# Patient Record
Sex: MALE | Race: WHITE | NOT HISPANIC OR LATINO | Employment: OTHER | URBAN - METROPOLITAN AREA
[De-identification: names, ages, dates, MRNs, and addresses within clinical notes are randomized per-mention and may not be internally consistent; named-entity substitution may affect disease eponyms.]

---

## 2017-01-02 ENCOUNTER — OFFICE VISIT (OUTPATIENT)
Dept: OPHTHALMOLOGY | Facility: CLINIC | Age: 72
End: 2017-01-02
Payer: COMMERCIAL

## 2017-01-02 DIAGNOSIS — H52.201 ASTIGMATISM, RIGHT: ICD-10-CM

## 2017-01-02 DIAGNOSIS — H52.4 PRESBYOPIA: Primary | ICD-10-CM

## 2017-01-02 DIAGNOSIS — H52.03 HYPERMETROPIA, BILATERAL: ICD-10-CM

## 2017-01-02 PROCEDURE — 92015 DETERMINE REFRACTIVE STATE: CPT | Performed by: OPHTHALMOLOGY

## 2017-01-02 PROCEDURE — 92004 COMPRE OPH EXAM NEW PT 1/>: CPT | Performed by: OPHTHALMOLOGY

## 2017-01-02 ASSESSMENT — SLIT LAMP EXAM - LIDS
COMMENTS: 2+, DERMATOCHALASIS
COMMENTS: 2+, DERMATOCHALASIS

## 2017-01-02 ASSESSMENT — VISUAL ACUITY
METHOD: SNELLEN - LINEAR
OD_CC: 20/20
OS_CC: J2
OD_CC: J2
OS_CC: 20/25
CORRECTION_TYPE: GLASSES

## 2017-01-02 ASSESSMENT — REFRACTION_WEARINGRX
OS_ADD: +2.25
OD_ADD: +2.25
OS_CYLINDER: +0.50
OS_SPHERE: +3.00
OD_AXIS: 055
OD_CYLINDER: +1.00
OS_AXIS: 055
SPECS_TYPE: PAL
OD_SPHERE: +3.00

## 2017-01-02 ASSESSMENT — CONF VISUAL FIELD
OD_NORMAL: 1
OS_NORMAL: 1

## 2017-01-02 ASSESSMENT — EXTERNAL EXAM - LEFT EYE: OS_EXAM: 2+ BROW PTOSIS, PROLAPSED FAT PADS: LOWER

## 2017-01-02 ASSESSMENT — REFRACTION_MANIFEST
OD_AXIS: 032
OS_CYLINDER: SPHERE
OD_CYLINDER: +0.50
OD_SPHERE: +3.00
OS_ADD: +3.00
OS_SPHERE: +3.00
OD_ADD: +3.00

## 2017-01-02 ASSESSMENT — TONOMETRY
IOP_METHOD: APPLANATION
OD_IOP_MMHG: 15
OS_IOP_MMHG: 18

## 2017-01-02 ASSESSMENT — EXTERNAL EXAM - RIGHT EYE: OD_EXAM: 2+ BROW PTOSIS, PROLAPSED FAT PADS: LOWER

## 2017-01-02 ASSESSMENT — CUP TO DISC RATIO
OD_RATIO: 0.0
OS_RATIO: 0.1

## 2017-01-02 NOTE — PATIENT INSTRUCTIONS
Glasses Rx given -optional   Possible posterior vitreous detachment (sudden onset large floater and/or flashing lights) discussed.  Call in September 2018 for an appointment in January 2019 for Complete Exam    Dr. Drummond [phone]

## 2017-01-02 NOTE — PROGRESS NOTES
" Current Eye Medications:  None     Subjective:  COMPLETE EYE EXAM Patient states that at times he notices that both eyes are foggy.  Vision is ok.  \"Just time to update\".     Objective:  See Ophthalmology Exam.       Assessment: Stable eye exam.      ICD-10-CM    1. Presbyopia H52.4 EYE EXAM (SIMPLE-NONBILLABLE)     REFRACTIVE STATUS   2. Astigmatism, right H52.201    3. Hypermetropia, bilateral H52.03         Plan: Glasses Rx given -optional   Possible posterior vitreous detachment (sudden onset large floater and/or flashing lights) discussed.  Call in September 2018 for an appointment in January 2019 for Complete Exam    Dr. Drummond [phone]           "

## 2017-01-02 NOTE — MR AVS SNAPSHOT
After Visit Summary   1/2/2017    Dejan Hammer    MRN: [id number]           Patient Information     Date Of Birth          1945        Visit Information        Provider Department      1/2/2017 8:30 AM Anson Drummond MD [facility]        Today's Diagnoses     Presbyopia    -  1     Astigmatism, right         Hypermetropia, bilateral           Care Instructions    Glasses Rx given -optional   Possible posterior vitreous detachment (sudden onset large floater and/or flashing lights) discussed.  Call in September 2018 for an appointment in January 2019 for Complete Exam    Dr. Drummond [phone]        Follow-ups after your visit        Who to contact     If you have questions or need follow up information about today's clinic visit or your schedule please contact [facility] directly at [phone].  Normal or non-critical lab and imaging results will be communicated to you by GT Solarhart, letter or phone within 4 business days after the clinic has received the results. If you do not hear from us within 7 days, please contact the clinic through GT Solarhart or phone. If you have a critical or abnormal lab result, we will notify you by phone as soon as possible.  Submit refill requests through [facility] or call your pharmacy and they will forward the refill request to us. Please allow 3 business days for your refill to be completed.          Additional Information About Your Visit        MyChart Information     [facility] gives you secure access to your electronic health record. If you see a primary care provider, you can also send messages to your care team and make appointments. If you have questions, please call your primary care clinic.  If you do not have a primary care provider, please call [phone] and they will assist you.         Blood Pressure from Last 3 Encounters:   12/02/16 126/76   11/07/16 122/76   05/24/16 117/71    Weight from Last 3 Encounters:   12/02/16  92.137 kg (203 lb 2 oz)   11/07/16 90.436 kg (199 lb 6 oz)   05/24/16 91.173 kg (201 lb)              We Performed the Following     EYE EXAM (SIMPLE-NONBILLABLE)     REFRACTIVE STATUS        Primary Care Provider Office Phone # Fax #    César Gregory PA-C [phone]       [facility] [address] [facility] [address]        Thank you!     Thank you for choosing [facility]  for your care. Our goal is always to provide you with excellent care. Hearing back from our patients is one way we can continue to improve our services. Please take a few minutes to complete the written survey that you may receive in the mail after your visit with us. Thank you!             Your Updated Medication List - Protect others around you: Learn how to safely use, store and throw away your medicines at www.disposemymeds.org.          This list is accurate as of: 1/2/17  9:16 AM.  Always use your most recent med list.                   Brand Name Dispense Instructions for use    ADVIL 200 MG tablet   Generic drug:  ibuprofen      Take 200 mg by mouth every 4 hours as needed.       aspirin 81 MG tablet      Take by mouth daily       CALCIUM 1000 + D PO      Take 2 tablets by mouth daily.       Fiber Tabs      Take 4 tablets by mouth daily       fish oil-omega-3 fatty acids 1000 MG capsule      Take 2 capsules by mouth daily       garlic 150 MG Tabs tablet      Take 150 mg by mouth daily       MULTIVITAMIN PO          oxyCODONE-acetaminophen 5-325 MG per tablet    PERCOCET    40 tablet    Take 1-2 tablets by mouth every 6 hours as needed for moderate to severe pain       SAW PALMETTO-PUMPKIN SEED OIL PO      Take 1 tablet by mouth daily. Plus Zinc       UNABLE TO FIND      MEDICATION NAME: Relief Factor (natural supplement for pain)       vitamin D3 2000 UNITS Caps      Take 4,000 Units by mouth daily       vitamin E 400 UNIT capsule      Take 400 Units by mouth daily

## 2017-03-27 ENCOUNTER — TRANSFERRED RECORDS (OUTPATIENT)
Dept: HEALTH INFORMATION MANAGEMENT | Facility: CLINIC | Age: 72
End: 2017-03-27

## 2017-06-23 ENCOUNTER — OFFICE VISIT (OUTPATIENT)
Dept: FAMILY MEDICINE | Facility: CLINIC | Age: 72
End: 2017-06-23
Payer: COMMERCIAL

## 2017-06-23 VITALS
WEIGHT: 199 LBS | TEMPERATURE: 98.3 F | DIASTOLIC BLOOD PRESSURE: 82 MMHG | BODY MASS INDEX: 28.49 KG/M2 | HEIGHT: 70 IN | SYSTOLIC BLOOD PRESSURE: 130 MMHG | HEART RATE: 72 BPM

## 2017-06-23 DIAGNOSIS — M70.22 OLECRANON BURSITIS OF LEFT ELBOW: Primary | ICD-10-CM

## 2017-06-23 PROCEDURE — 99213 OFFICE O/P EST LOW 20 MIN: CPT | Performed by: PHYSICIAN ASSISTANT

## 2017-06-23 NOTE — MR AVS SNAPSHOT
After Visit Summary   6/23/2017    Dejan Hammer    MRN: [id number]           Patient Information     Date Of Birth          1945        Visit Information        Provider Department      6/23/2017 2:20 PM César Gregory PA-C [facility]        Today's Diagnoses     Olecranon bursitis of left elbow    -  1      Care Instructions                   Elbow (Olecranon) Bursitis              What is elbow (olecranon) bursitis?   A bursa is a fluid-filled sac that acts as a cushion between tendons, bones, and skin. Irritation or inflammation of a bursa is called bursitis. Olecranon bursitis causes pain or swelling at the point of the elbow.   How does it occur?   Repeated injury, such as falling onto the elbow or rubbing the elbow against a hard surface, irritates the bursa.   What are the symptoms?   The bursa at the point of the elbow is swollen. This swelling may be painful. It may hurt to bend and straighten your elbow. There may be warmth and redness. Sometimes the fluid inside the bursa can get infected.   How is it diagnosed?   Your healthcare provider will review your symptoms and examine your elbow.   How is it treated?   To treat this condition:   Put an ice pack, gel pack, or package of frozen vegetables, wrapped in a cloth on the area every 3 to 4 hours, for up to 20 minutes at a time.   Wrap an elastic bandage around your elbow to keep the bursa from swelling more.   Protect your elbow with a pad.   Take an anti-inflammatory medicine such as ibuprofen, or other medicine as directed by your provider. Nonsteroidal anti-inflammatory medicines (NSAIDs) may cause stomach bleeding and other problems. These risks increase with age. Read the label and take as directed. Unless recommended by your healthcare provider, do not take for more than 10 days.   Your provider may remove some of the bursa fluid with a needle and syringe. In some cases, ongoing (chronic) bursitis may  require surgical removal of the bursa.   How long will the effects last?   The length of recovery depends on many factors such as your age, health, and if you have had a previous injury. Recovery time also depends on the severity of the injury. The pain is usually gone within a few weeks, but there may be swelling for up to several months. Ask your healthcare provider when you can return to your normal activities.   How can I prevent it?   Olecranon bursitis can be best prevented by avoiding direct contact to the point of your elbow. It is important not to irritate the bursa by leaning your elbow onto a surface such as a table or a desk.           Published by [employer].  This content is reviewed periodically and is subject to change as new health information becomes available. The information is intended to inform and educate and is not a replacement for medical evaluation, advice, diagnosis or treatment by a healthcare professional.   Written by Juan Pablo Carvalho MD, for [employer].   ? 2010 [employer] and/or its affiliates. All Rights Reserved.   Copyright   Clinical Reference Systems 2011      [facility]   Discharged by : Mariposa TAVERA Certified Medical Assistant (AAMA)June 23, 2017 3:06 PM    Paper scripts provided to patient : none   If you have any questions regarding to your visit please contact your care team:   [facility] Clinic Hours Telephone Number   SONU [address] PA-C    7am-7pm Monday - Thursday   7am-5pm Fridays  [phone]   (Appointment scheduling available 24/7)   RN Line   [phone] option 2       What options do I have for visits at the clinic other than the traditional office visit?   To expand how we care for you, many of our providers are utilizing electronic visits (e-visits) and telephone visits, when medically appropriate, for interactions with their patients rather than a visit in the clinic. We also offer  nurse visits for many medical concerns. Just like any other service, we will bill your insurance company for this type of visit based on time spent on the phone with your provider. Not all insurance companies cover these visits. Please check with your medical insurance if this type of visit is covered. You will be responsible for any charges that are not paid by your insurance.     E-visits via Emergent Healthhart: generally incur a $35.00 fee.     Telephone visits:   Time spent on the phone: *charged based on time that is spent on the phone in increments of 10 minutes. Estimated cost:   5-10 mins $30.00   11-20 mins. $59.00   21-30 mins. $85.00   Use navigaya (secure email communication and access to your chart) to send your primary care provider a message or make an appointment. Ask someone on your Team how to sign up for navigaya.   For a Price Quote for your services, please call our Impulsiv Line at [phone].   As always, Thank you for trusting us with your health care needs!                [place] [facility]:    Scheduling Appointments  [facility]  Call: [phone]    [facility]  Call: [phone]    [facility]  Call: [phone]                    Follow-ups after your visit        Who to contact     If you have questions or need follow up information about today's clinic visit or your schedule please contact [facility] directly at [phone].  Normal or non-critical lab and imaging results will be communicated to you by MyChart, letter or phone within 4 business days after the clinic has received the results. If you do not hear from us within 7 days, please contact the clinic through Emergent Healthhart or phone. If you have a critical or abnormal lab result, we will notify you by phone as soon as possible.  Submit refill requests through navigaya or call your pharmacy and they will forward the refill request to  "us. Please allow 3 business days for your refill to be completed.          Additional Information About Your Visit        MyChart Information     TeamSupporthart gives you secure access to your electronic health record. If you see a primary care provider, you can also send messages to your care team and make appointments. If you have questions, please call your primary care clinic.  If you do not have a primary care provider, please call [phone] and they will assist you.        Care EveryWhere ID     This is your Care EveryWhere ID. This could be used by other organizations to access your [place] medical records  [id number]        Your Vitals Were     Pulse Temperature Height BMI (Body Mass Index)          72 98.3  F (36.8  C) (Oral) 5' 10\" (1.778 m) 28.55 kg/m2         Blood Pressure from Last 3 Encounters:   06/23/17 130/82   12/02/16 126/76   11/07/16 122/76    Weight from Last 3 Encounters:   06/23/17 199 lb (90.3 kg)   12/02/16 203 lb 2 oz (92.1 kg)   11/07/16 199 lb 6 oz (90.4 kg)              Today, you had the following     No orders found for display       Primary Care Provider Office Phone # Fax #    César Gregory PA-C [phone]       [address] [address]        Equal Access to Services     BETO VOGT : Hadii aad ku hadasho Soomaali, waaxda luqadaha, qaybta kaalmada adeegyada, laquita almanza. So [facility] [phone].    ATENCIÓN: Si habla español, tiene a mercado disposición servicios gratuitos de asistencia lingüística. Ever akhtar [phone].    We comply with applicable federal civil rights laws and [place] laws. We do not discriminate on the basis of race, color, national origin, age, disability sex, sexual orientation or gender identity.            Thank you!     Thank you for choosing [facility]  for your care. Our goal is always to provide you with excellent care. Hearing back from our " patients is one way we can continue to improve our services. Please take a few minutes to complete the written survey that you may receive in the mail after your visit with us. Thank you!             Your Updated Medication List - Protect others around you: Learn how to safely use, store and throw away your medicines at www.disposemymeds.org.          This list is accurate as of: 6/23/17  3:06 PM.  Always use your most recent med list.                   Brand Name Dispense Instructions for use Diagnosis    ADVIL 200 MG tablet   Generic drug:  ibuprofen      Take 200 mg by mouth every 4 hours as needed.        aspirin 81 MG tablet      Take by mouth daily        CALCIUM 1000 + D PO      Take 2 tablets by mouth daily.        Fiber Tabs      Take 4 tablets by mouth daily        fish oil-omega-3 fatty acids 1000 MG capsule      Take 2 capsules by mouth daily        garlic 150 MG Tabs tablet      Take 150 mg by mouth daily        MULTIVITAMIN PO           SAW PALMETTO-PUMPKIN SEED OIL PO      Take 1 tablet by mouth daily. Plus Zinc        UNABLE TO FIND      MEDICATION NAME: Relief Factor (natural supplement for pain)        vitamin D3 2000 UNITS Caps      Take 4,000 Units by mouth daily        vitamin E 400 UNIT capsule      Take 400 Units by mouth daily

## 2017-06-23 NOTE — PROGRESS NOTES
"  SUBJECTIVE:                                                    Dejan Hammer is a 72 year old male who presents to clinic today for the following health issues:    Joint Pain    Onset: 2 weeks ago    Description:   Location: left elbow  Character: no pain just swelling    Intensity: mild    Progression of Symptoms: same    Accompanying Signs & Symptoms:  Other symptoms: swelling    History:   Previous similar pain: no       Precipitating factors:   Trauma or overuse: no     Alleviating factors:  Improved by: nothing    Therapies Tried and outcome: none    Problem list and histories reviewed & adjusted, as indicated.  Additional history: as documented    Labs reviewed in EPIC    Reviewed and updated as needed this visit by clinical staff       Reviewed and updated as needed this visit by Provider       ROS:  Constitutional, HEENT, cardiovascular, pulmonary, gi and gu systems are negative, except as otherwise noted.    OBJECTIVE:     /82 (BP Location: Right arm, Cuff Size: Adult Regular)  Pulse 72  Temp 98.3  F (36.8  C) (Oral)  Ht 5' 10\" (1.778 m)  Wt 199 lb (90.3 kg)  BMI 28.55 kg/m2  Body mass index is 28.55 kg/(m^2).  GENERAL: healthy, alert and no distress  MUSC: Left elbow olecranon bursa enlargement, fluctuant, non tender, no erythema. ROM of the left elbow and arm is normal     ASSESSMENT/PLAN:       ICD-10-CM    1. Olecranon bursitis of left elbow M70.22       Reassurance. Reviewed avoiding exacerbating activities. PRICE, wrap applied. Follow up with sports or ortho if persisting.     AGUSTIN RUBI PA-C  [facility]    "

## 2017-06-23 NOTE — PATIENT INSTRUCTIONS
Elbow (Olecranon) Bursitis              What is elbow (olecranon) bursitis?   A bursa is a fluid-filled sac that acts as a cushion between tendons, bones, and skin. Irritation or inflammation of a bursa is called bursitis. Olecranon bursitis causes pain or swelling at the point of the elbow.   How does it occur?   Repeated injury, such as falling onto the elbow or rubbing the elbow against a hard surface, irritates the bursa.   What are the symptoms?   The bursa at the point of the elbow is swollen. This swelling may be painful. It may hurt to bend and straighten your elbow. There may be warmth and redness. Sometimes the fluid inside the bursa can get infected.   How is it diagnosed?   Your healthcare provider will review your symptoms and examine your elbow.   How is it treated?   To treat this condition:   Put an ice pack, gel pack, or package of frozen vegetables, wrapped in a cloth on the area every 3 to 4 hours, for up to 20 minutes at a time.   Wrap an elastic bandage around your elbow to keep the bursa from swelling more.   Protect your elbow with a pad.   Take an anti-inflammatory medicine such as ibuprofen, or other medicine as directed by your provider. Nonsteroidal anti-inflammatory medicines (NSAIDs) may cause stomach bleeding and other problems. These risks increase with age. Read the label and take as directed. Unless recommended by your healthcare provider, do not take for more than 10 days.   Your provider may remove some of the bursa fluid with a needle and syringe. In some cases, ongoing (chronic) bursitis may require surgical removal of the bursa.   How long will the effects last?   The length of recovery depends on many factors such as your age, health, and if you have had a previous injury. Recovery time also depends on the severity of the injury. The pain is usually gone within a few weeks, but there may be swelling for up to several months. Ask your healthcare provider when you  can return to your normal activities.   How can I prevent it?   Olecranon bursitis can be best prevented by avoiding direct contact to the point of your elbow. It is important not to irritate the bursa by leaning your elbow onto a surface such as a table or a desk.           Published by [employer].  This content is reviewed periodically and is subject to change as new health information becomes available. The information is intended to inform and educate and is not a replacement for medical evaluation, advice, diagnosis or treatment by a healthcare professional.   Written by Juan Pablo Carvalho MD, for [employer].   ? 2010 [facility] and/or its affiliates. All Rights Reserved.   Copyright   Clinical Reference Systems 2011      [facility]   Discharged by : Mariposa TAVERA Certified Medical Assistant (AAMA)June 23, 2017 3:06 PM    Paper scripts provided to patient : none   If you have any questions regarding to your visit please contact your care team:   [facility] Clinic Hours Telephone Number   SONU Galvez Dr., PA-C    7am-7pm Monday - Thursday   7am-5pm Fridays  [phone]   (Appointment scheduling available 24/7)   RN Line   [phone] option 2       What options do I have for visits at the clinic other than the traditional office visit?   To expand how we care for you, many of our providers are utilizing electronic visits (e-visits) and telephone visits, when medically appropriate, for interactions with their patients rather than a visit in the clinic. We also offer nurse visits for many medical concerns. Just like any other service, we will bill your insurance company for this type of visit based on time spent on the phone with your provider. Not all insurance companies cover these visits. Please check with your medical insurance if this type of visit is covered. You will be responsible for any charges that are not paid by your insurance.      E-visits via GreenerUhart: generally incur a $35.00 fee.     Telephone visits:   Time spent on the phone: *charged based on time that is spent on the phone in increments of 10 minutes. Estimated cost:   5-10 mins $30.00   11-20 mins. $59.00   21-30 mins. $85.00   Use GreenerUhart (secure email communication and access to your chart) to send your primary care provider a message or make an appointment. Ask someone on your Team how to sign up for [employer].   For a Price Quote for your services, please call our Consumer Price Line at [phone].   As always, Thank you for trusting us with your health care needs!                [place] [facility]:    Scheduling Appointments  [facility]  Call: [phone]    [facility]  Call: [phone]    [facility]  Call: [phone]

## 2017-07-31 ENCOUNTER — RADIANT APPOINTMENT (OUTPATIENT)
Dept: CT IMAGING | Facility: CLINIC | Age: 72
End: 2017-07-31
Attending: FAMILY MEDICINE
Payer: COMMERCIAL

## 2017-07-31 ENCOUNTER — TELEPHONE (OUTPATIENT)
Dept: FAMILY MEDICINE | Facility: CLINIC | Age: 72
End: 2017-07-31

## 2017-07-31 ENCOUNTER — OFFICE VISIT (OUTPATIENT)
Dept: FAMILY MEDICINE | Facility: CLINIC | Age: 72
End: 2017-07-31
Payer: COMMERCIAL

## 2017-07-31 VITALS
HEART RATE: 100 BPM | DIASTOLIC BLOOD PRESSURE: 78 MMHG | HEIGHT: 70 IN | TEMPERATURE: 97.2 F | BODY MASS INDEX: 29.35 KG/M2 | SYSTOLIC BLOOD PRESSURE: 138 MMHG | WEIGHT: 205 LBS | OXYGEN SATURATION: 98 %

## 2017-07-31 DIAGNOSIS — R10.32 ABDOMINAL PAIN, LEFT LOWER QUADRANT: ICD-10-CM

## 2017-07-31 DIAGNOSIS — R10.32 ABDOMINAL PAIN, LEFT LOWER QUADRANT: Primary | ICD-10-CM

## 2017-07-31 LAB
BASOPHILS # BLD AUTO: 0 10E9/L (ref 0–0.2)
BASOPHILS NFR BLD AUTO: 0.4 %
DIFFERENTIAL METHOD BLD: NORMAL
EOSINOPHIL # BLD AUTO: 0.2 10E9/L (ref 0–0.7)
EOSINOPHIL NFR BLD AUTO: 3.2 %
ERYTHROCYTE [DISTWIDTH] IN BLOOD BY AUTOMATED COUNT: 12.9 % (ref 10–15)
HCT VFR BLD AUTO: 42 % (ref 40–53)
HGB BLD-MCNC: 13.8 G/DL (ref 13.3–17.7)
LYMPHOCYTES # BLD AUTO: 1.7 10E9/L (ref 0.8–5.3)
LYMPHOCYTES NFR BLD AUTO: 29.8 %
MCH RBC QN AUTO: 31 PG (ref 26.5–33)
MCHC RBC AUTO-ENTMCNC: 32.9 G/DL (ref 31.5–36.5)
MCV RBC AUTO: 94 FL (ref 78–100)
MONOCYTES # BLD AUTO: 0.4 10E9/L (ref 0–1.3)
MONOCYTES NFR BLD AUTO: 7.4 %
NEUTROPHILS # BLD AUTO: 3.3 10E9/L (ref 1.6–8.3)
NEUTROPHILS NFR BLD AUTO: 59.2 %
PLATELET # BLD AUTO: 247 10E9/L (ref 150–450)
RBC # BLD AUTO: 4.45 10E12/L (ref 4.4–5.9)
WBC # BLD AUTO: 5.6 10E9/L (ref 4–11)

## 2017-07-31 PROCEDURE — 99214 OFFICE O/P EST MOD 30 MIN: CPT | Performed by: FAMILY MEDICINE

## 2017-07-31 PROCEDURE — 36415 COLL VENOUS BLD VENIPUNCTURE: CPT | Performed by: FAMILY MEDICINE

## 2017-07-31 PROCEDURE — 85025 COMPLETE CBC W/AUTO DIFF WBC: CPT | Performed by: FAMILY MEDICINE

## 2017-07-31 PROCEDURE — 82565 ASSAY OF CREATININE: CPT

## 2017-07-31 PROCEDURE — 74177 CT ABD & PELVIS W/CONTRAST: CPT | Mod: TC

## 2017-07-31 RX ORDER — IOPAMIDOL 755 MG/ML
100 INJECTION, SOLUTION INTRAVASCULAR ONCE
Status: COMPLETED | OUTPATIENT
Start: 2017-07-31 | End: 2017-07-31

## 2017-07-31 RX ADMIN — IOPAMIDOL 100 ML: 755 INJECTION, SOLUTION INTRAVASCULAR at 12:25

## 2017-07-31 NOTE — TELEPHONE ENCOUNTER
Reason for call:  Patient reporting a symptom    Symptom or request: Abdominal pain    Additional comments: KURT Inman triaging patient.          Call taken on 7/31/2017 at 7:49 AM by Emma Heart

## 2017-07-31 NOTE — PATIENT INSTRUCTIONS
[facility]    If you have any questions regarding to your visit please contact your care team:       Team Purple:   Clinic Hours Telephone Number   Dr. Juhi Nielsen     7am-7pm  Monday - Thursday   7am-5pm  Fridays  [phone]  (Appointment scheduling available 24/7)    Questions about your Visit?   Team Line:  [phone]   Urgent Care - [place] and [facility] - 11am-9pm Monday-Friday Saturday-Sunday- 9am-5pm   [place] - 5pm-9pm Monday-Friday Saturday-Sunday- 9am-5pm  [phone] - [facility]  [phone] - [place]       What options do I have for visits at the clinic other than the traditional office visit?  To expand how we care for you, many of our providers are utilizing electronic visits (e-visits) and telephone visits, when medically appropriate, for interactions with their patients rather than a visit in the clinic.   We also offer nurse visits for many medical concerns. Just like any other service, we will bill your insurance company for this type of visit based on time spent on the phone with your provider. Not all insurance companies cover these visits. Please check with your medical insurance if this type of visit is covered. You will be responsible for any charges that are not paid by your insurance.      E-visits via Fuze:  generally incur a $35.00 fee.  Telephone visits:  Time spent on the phone: *charged based on time that is spent on the phone in increments of 10 minutes. Estimated cost:   5-10 mins $30.00   11-20 mins. $59.00   21-30 mins. $85.00     Use PayLeaset (secure email communication and access to your chart) to send your primary care provider a message or make an appointment. Ask someone on your Team how to sign up for Fuze.  For a Price Quote for your services, please call our Consumer Price Line at [phone].  As always, Thank you for trusting us with your health care needs!

## 2017-07-31 NOTE — MR AVS SNAPSHOT
After Visit Summary   7/31/2017    Dejan Hammer    MRN: [id number]           Patient Information     Date Of Birth          1945        Visit Information        Provider Department      7/31/2017 9:00 AM Juhi Hagen MD [facility]        Today's Diagnoses     Abdominal pain, left lower quadrant    -  1      Care Instructions    [facility]    If you have any questions regarding to your visit please contact your care team:       Team Purple:   Clinic Hours Telephone Number   Dr. Juhi Nielsen     7am-7pm  Monday - Thursday   7am-5pm  Fridays  [phone]  (Appointment scheduling available 24/7)    Questions about your Visit?   Team Line:  [phone]   Urgent Care - [place] [facility] - 11am-9pm Monday-Friday Saturday-Sunday- 9am-5pm   [place] - 5pm-9pm Monday-Friday Saturday-Sunday- 9am-5pm  [phone] - [facility]  [phone] - [place]       What options do I have for visits at the clinic other than the traditional office visit?  To expand how we care for you, many of our providers are utilizing electronic visits (e-visits) and telephone visits, when medically appropriate, for interactions with their patients rather than a visit in the clinic.   We also offer nurse visits for many medical concerns. Just like any other service, we will bill your insurance company for this type of visit based on time spent on the phone with your provider. Not all insurance companies cover these visits. Please check with your medical insurance if this type of visit is covered. You will be responsible for any charges that are not paid by your insurance.      E-visits via Fliggo:  generally incur a $35.00 fee.  Telephone visits:  Time spent on the phone: *charged based on time that is spent on the phone in increments of 10 minutes. Estimated cost:   5-10 mins $30.00   11-20 mins. $59.00   21-30 mins. $85.00      Use Amitree (secure email communication and access to your chart) to send your primary care provider a message or make an appointment. Ask someone on your Team how to sign up for Amitree.  For a Price Quote for your services, please call our [employer] Price Line at [phone].  As always, Thank you for trusting us with your health care needs!              Follow-ups after your visit        Future tests that were ordered for you today     Open Future Orders        Priority Expected Expires Ordered    CT Abdomen Pelvis w Contrast Routine  7/31/2018 7/31/2017            Who to contact     If you have questions or need follow up information about today's clinic visit or your schedule please contact [facility] directly at [phone].  Normal or non-critical lab and imaging results will be communicated to you by [employer], letter or phone within 4 business days after the clinic has received the results. If you do not hear from us within 7 days, please contact the clinic through [employer] or phone. If you have a critical or abnormal lab result, we will notify you by phone as soon as possible.  Submit refill requests through Amitree or call your pharmacy and they will forward the refill request to us. Please allow 3 business days for your refill to be completed.          Additional Information About Your Visit        [employer] Information     Amitree gives you secure access to your electronic health record. If you see a primary care provider, you can also send messages to your care team and make appointments. If you have questions, please call your primary care clinic.  If you do not have a primary care provider, please call [phone] and they will assist you.        Care EveryWhere ID     This is your Care EveryWhere ID. This could be used by other organizations to access your [facility] medical records  [id number]        Your Vitals Were     Pulse Temperature Height Pulse Oximetry BMI (Body Mass Index)  "      100 97.2  F (36.2  C) 5' 10\" (1.778 m) 98% 29.41 kg/m2        Blood Pressure from Last 3 Encounters:   07/31/17 138/78   06/23/17 130/82   12/02/16 126/76    Weight from Last 3 Encounters:   07/31/17 205 lb (93 kg)   06/23/17 199 lb (90.3 kg)   12/02/16 203 lb 2 oz (92.1 kg)              We Performed the Following     CBC with platelets differential        Primary Care Provider Office Phone # Fax #    César Gregory PA-C [phone]       [facility] [address] [place] [id number]        Equal Access to Services     BETO VOGT : Hadii aad ku hadasho Sogadiel, waaxda luqadaha, qaybta kaalmada adeegyada, waxay idiin hayholly almanza. So [facility] [phone].    ATENCIÓN: Si habla español, tiene a mercado disposición servicios gratuitos de asistencia lingüística. [facility] [phone].    We comply with applicable federal civil rights laws and [place] laws. We do not discriminate on the basis of race, color, national origin, age, disability sex, sexual orientation or gender identity.            Thank you!     Thank you for choosing [facility]  for your care. Our goal is always to provide you with excellent care. Hearing back from our patients is one way we can continue to improve our services. Please take a few minutes to complete the written survey that you may receive in the mail after your visit with us. Thank you!             Your Updated Medication List - Protect others around you: Learn how to safely use, store and throw away your medicines at www.disposemymeds.org.          This list is accurate as of: 7/31/17  9:28 AM.  Always use your most recent med list.                   Brand Name Dispense Instructions for use Diagnosis    ADVIL 200 MG tablet   Generic drug:  ibuprofen      Take 200 mg by mouth every 4 hours as needed.        aspirin 81 MG tablet      Take by mouth daily        CALCIUM 1000 + D PO      Take 2 tablets by mouth " daily.        Fiber Tabs      Take 4 tablets by mouth daily        fish oil-omega-3 fatty acids 1000 MG capsule      Take 2 capsules by mouth daily        garlic 150 MG Tabs tablet      Take 150 mg by mouth daily        MULTIVITAMIN PO           SAW PALMETTO-PUMPKIN SEED OIL PO      Take 1 tablet by mouth daily. Plus Zinc        UNABLE TO FIND      MEDICATION NAME: Relief Factor (natural supplement for pain)        vitamin D3 2000 UNITS Caps      Take 4,000 Units by mouth daily        vitamin E 400 UNIT capsule      Take 400 Units by mouth daily

## 2017-07-31 NOTE — LETTER
[facility]  [address] [place] [address]    July 31, 2017    Dejan Hammer  [address]  [facility] [address]          Dear Dejan,    Your blood tests were all normal. Please let me know if your pain doesn't resolve    Enclosed is a copy of your results.     Results for orders placed or performed in visit on 07/31/17   CBC with platelets differential   Result Value Ref Range    WBC 5.6 4.0 - 11.0 10e9/L    RBC Count 4.45 4.4 - 5.9 10e12/L    Hemoglobin 13.8 13.3 - 17.7 g/dL    Hematocrit 42.0 40.0 - 53.0 %    MCV 94 78 - 100 fl    MCH 31.0 26.5 - 33.0 pg    MCHC 32.9 31.5 - 36.5 g/dL    RDW 12.9 10.0 - 15.0 %    Platelet Count 247 150 - 450 10e9/L    Diff Method Automated Method     % Neutrophils 59.2 %    % Lymphocytes 29.8 %    % Monocytes 7.4 %    % Eosinophils 3.2 %    % Basophils 0.4 %    Absolute Neutrophil 3.3 1.6 - 8.3 10e9/L    Absolute Lymphocytes 1.7 0.8 - 5.3 10e9/L    Absolute Monocytes 0.4 0.0 - 1.3 10e9/L    Absolute Eosinophils 0.2 0.0 - 0.7 10e9/L    Absolute Basophils 0.0 0.0 - 0.2 10e9/L       If you have any questions or concerns, please call myself or my nurse at [phone].      Sincerely,        Juhi Hagen MD/VAUGHN

## 2017-07-31 NOTE — NURSING NOTE
"Chief Complaint   Patient presents with     Back Pain       Initial /78 (BP Location: Right arm, Patient Position: Chair, Cuff Size: Adult Large)  Pulse 100  Temp 97.2  F (36.2  C)  Ht 5' 10\" (1.778 m)  Wt 205 lb (93 kg)  SpO2 98%  BMI 29.41 kg/m2 Estimated body mass index is 29.41 kg/(m^2) as calculated from the following:    Height as of this encounter: 5' 10\" (1.778 m).    Weight as of this encounter: 205 lb (93 kg).  Medication Reconciliation: complete   Lu Randall MA      "
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
## 2017-07-31 NOTE — TELEPHONE ENCOUNTER
Dejan Hammer is a 72 year old male who calls with abdominal pain.     NURSING ASSESSMENT:  The pain began 7 days ago when I was lifting chunks of cement.    Pain scale (0-10): 3/10  The pain is described as shooting, throbbing nerve pain and is located LLQ/groin, back & knee pain, which is with radiation to L back. Knee pain went away after a few days.   Symptom associated with the abdominal pain: none.  Patient has had previous abdominal surgery, including left hernia surgery 1.5 years ago with mesh.  Pain is aggravated by activity- bending/lifting, and relieved by rest. Now has tinge of back pain with walking, can't straighten his back after sitting or lying.   Allergies: No Known Allergies    NURSING PLAN: Nursing advice to patient scheduled in 1 hour at another clinic    RECOMMENDED DISPOSITION:    Will comply with recommendation: Yes  If further questions/concerns or if symptoms do not improve, worsen or new symptoms develop, call your PCP or [facility] as soon as possible.    Guideline used:  Telephone Triage Protocols for Nurses, Fifth Edition, Dulce Hurtado RN

## 2017-08-01 LAB
CREAT BLD-MCNC: 1.3 MG/DL (ref 0.5–1.2)
GFR SERPL CREATININE-BSD FRML MDRD: 55 ML/MIN/{1.73_M2}
GFRB: 55

## 2017-08-03 ENCOUNTER — TELEPHONE (OUTPATIENT)
Dept: FAMILY MEDICINE | Facility: CLINIC | Age: 72
End: 2017-08-03

## 2017-08-03 NOTE — TELEPHONE ENCOUNTER
Reason for Call:  Other appointment    Detailed comments: Patient's wife is calling to discuss the way a visit was coded. She is getting the bill for cholesterol, and she believes it's because of the was Jose Gregory coded it.    Phone Number Patient can be reached at: Home number on file [phone] (home)    Best Time: anytime    Can we leave a detailed message on this number? YES    Call taken on 8/3/2017 at 4:30 PM by Yuriy Espinoza

## 2017-08-04 NOTE — TELEPHONE ENCOUNTER
She will need to contact our business office regarding this ([phone]) and our coding department. Gave phone numbers.    Jasmyn Chavez,

## 2017-11-10 ENCOUNTER — OFFICE VISIT (OUTPATIENT)
Dept: FAMILY MEDICINE | Facility: CLINIC | Age: 72
End: 2017-11-10
Payer: COMMERCIAL

## 2017-11-10 VITALS
HEIGHT: 71 IN | TEMPERATURE: 98 F | WEIGHT: 197 LBS | BODY MASS INDEX: 27.58 KG/M2 | HEART RATE: 84 BPM | DIASTOLIC BLOOD PRESSURE: 76 MMHG | SYSTOLIC BLOOD PRESSURE: 128 MMHG

## 2017-11-10 DIAGNOSIS — Z23 NEED FOR PROPHYLACTIC VACCINATION AGAINST STREPTOCOCCUS PNEUMONIAE (PNEUMOCOCCUS): ICD-10-CM

## 2017-11-10 DIAGNOSIS — Z00.00 ROUTINE GENERAL MEDICAL EXAMINATION AT A HEALTH CARE FACILITY: Primary | ICD-10-CM

## 2017-11-10 PROCEDURE — G0009 ADMIN PNEUMOCOCCAL VACCINE: HCPCS | Performed by: PHYSICIAN ASSISTANT

## 2017-11-10 PROCEDURE — 90670 PCV13 VACCINE IM: CPT | Performed by: PHYSICIAN ASSISTANT

## 2017-11-10 PROCEDURE — 99397 PER PM REEVAL EST PAT 65+ YR: CPT | Mod: 25 | Performed by: PHYSICIAN ASSISTANT

## 2017-11-10 RX ORDER — ACETAMINOPHEN 160 MG
2000 TABLET,DISINTEGRATING ORAL DAILY
Qty: 30 CAPSULE | Refills: 0 | COMMUNITY
Start: 2017-11-10

## 2017-11-10 NOTE — NURSING NOTE
"Chief Complaint   Patient presents with     Medicare Visit     Wellness Visit       Flu Shot     Pt declined      LAB REQUEST     Pt would like to discuss having his cholesterol check today - Pt is fasting        Initial /76 (BP Location: Right arm, Cuff Size: Adult Regular)  Pulse 84  Temp 98  F (36.7  C) (Oral)  Ht 5' 10.75\" (1.797 m)  Wt 197 lb (89.4 kg)  BMI 27.67 kg/m2 Estimated body mass index is 27.67 kg/(m^2) as calculated from the following:    Height as of this encounter: 5' 10.75\" (1.797 m).    Weight as of this encounter: 197 lb (89.4 kg).  Medication Reconciliation: complete     Bridgett Guadarrama CMA (AAMA)      "

## 2017-11-10 NOTE — PROGRESS NOTES
SUBJECTIVE:   Dejan Hammer is a 72 year old male who presents for Preventive Visit.    Are you in the first 12 months of your Medicare coverage?  No    Physical   Annual:     Getting at least 3 servings of Calcium per day::  Yes    Bi-annual eye exam::  Yes    Dental care twice a year::  Yes    Sleep apnea or symptoms of sleep apnea::  None    Diet::  Regular (no restrictions)    Frequency of exercise::  1 day/week    Duration of exercise::  15-30 minutes    Taking medications regularly::  Yes    Medication side effects::  None    Additional concerns today::  No      COGNITIVE SCREEN  1) Repeat 3 items (Banana, Sunrise, Chair)    2) Clock draw: NORMAL  3) 3 item recall: Recalls 2 objects   Results: NORMAL clock, 1-2 items recalled: COGNITIVE IMPAIRMENT LESS LIKELY    Mini-CogTM Copyright S Nicolas. Licensed by the author for use in [facility]; reprinted with permission (addis@[facility]). All rights reserved.          Reviewed and updated as needed this visit by clinical staff  Tobacco  Allergies  Meds  Med Hx  Surg Hx  Fam Hx  Soc Hx        Reviewed and updated as needed this visit by Provider        Social History   Substance Use Topics     Smoking status: Never Smoker     Smokeless tobacco: Never Used      Comment: smoke free household.     Alcohol use No       The patient does not drink >3 drinks per day nor >7 drinks per week.        Today's PHQ-2 Score:   PHQ-2 ( 1999 Pfizer) 11/10/2017   Q1: Little interest or pleasure in doing things 0   Q2: Feeling down, depressed or hopeless 0   PHQ-2 Score 0   Q1: Little interest or pleasure in doing things Not at all   Q2: Feeling down, depressed or hopeless Not at all   PHQ-2 Score 0       Do you feel safe in your environment - YES    Do you have a Health Care Directive?: Yes: Patient states has Advance Directive and will bring in a copy to clinic.    Current providers sharing in care for this patient include:   Patient Care Team:  César Gregory,  "[facility] as PCP - General (Family Practice)      Hearing impairment: No    Ability to successfully perform activities of daily living: Yes, no assistance needed     Fall risk:  Fallen 2 or more times in the past year?: No  Any fall with injury in the past year?: No      Home safety:  none identified  click delete button to remove this line now    The following health maintenance items are reviewed in Epic and correct as of today:  Health Maintenance   Topic Date Due     INFLUENZA VACCINE (SYSTEM ASSIGNED)  09/01/2017     FALL RISK ASSESSMENT  11/07/2017     PNEUMOCOCCAL (2 of 2 - PCV13) 11/07/2017     ADVANCE DIRECTIVE PLANNING Q5 YRS  05/24/2021     LIPID SCREEN Q5 YR MALE (SYSTEM ASSIGNED)  11/07/2021     COLONOSCOPY Q5 YR  03/27/2022     TETANUS IMMUNIZATION (SYSTEM ASSIGNED)  10/17/2023     AORTIC ANEURYSM SCREENING (SYSTEM ASSIGNED)  Completed     HEPATITIS C SCREENING  Completed     Labs reviewed in EPIC    Pneumonia Vaccine: Prevnar due - doing today     Review of Systems  Constitutional, HEENT, cardiovascular, pulmonary, GI, , musculoskeletal, neuro, skin, endocrine and psych systems are negative, except as otherwise noted.      OBJECTIVE:   /76 (BP Location: Right arm, Cuff Size: Adult Regular)  Pulse 84  Temp 98  F (36.7  C) (Oral)  Ht 5' 10.75\" (1.797 m)  Wt 197 lb (89.4 kg)  BMI 27.67 kg/m2 Estimated body mass index is 27.67 kg/(m^2) as calculated from the following:    Height as of this encounter: 5' 10.75\" (1.797 m).    Weight as of this encounter: 197 lb (89.4 kg).  Physical Exam  GENERAL: healthy, alert and no distress  EYES: Eyes grossly normal to inspection, PERRL and conjunctivae and sclerae normal  HENT: ear canals and TM's normal, nose and mouth without ulcers or lesions  NECK: no adenopathy, no asymmetry, masses, or scars and thyroid normal to palpation  RESP: lungs clear to auscultation - no rales, rhonchi or wheezes  CV: regular rate and rhythm, normal S1 S2, no S3 or S4, no murmur, " "click or rub, no peripheral edema and peripheral pulses strong  ABDOMEN: soft, nontender, no hepatosplenomegaly, no masses and bowel sounds normal  MS: no gross musculoskeletal defects noted, no edema  SKIN: no suspicious lesions or rashes  NEURO: Normal strength and tone, mentation intact and speech normal  PSYCH: mentation appears normal, affect normal/bright  LYMPH: no cervical, supraclavicular, axillary, or inguinal adenopathy    ASSESSMENT / PLAN:   (Z00.00) Routine general medical examination at a health care facility  (primary encounter diagnosis)  Comment: Well person   Plan: Diet, exercise, wellness and other preventive recommendations related to health maintenance were discussed.  Follow up as needed for acute issues.  Physical exam in 1 year.     (Z23) Need for prophylactic vaccination against Streptococcus pneumoniae (pneumococcus)  Comment:   Plan: Pneumococcal vaccine 13 valent PCV13 IM         (Prevnar) [55919]        Given    End of Life Planning:  Patient currently has an advanced directive: Yes.  Practitioner is supportive of decision.    COUNSELING:  Reviewed preventive health counseling, as reflected in patient instructions        Estimated body mass index is 27.67 kg/(m^2) as calculated from the following:    Height as of this encounter: 5' 10.75\" (1.797 m).    Weight as of this encounter: 197 lb (89.4 kg).     reports that he has never smoked. He has never used smokeless tobacco.        Appropriate preventive services were discussed with this patient, including applicable screening as appropriate for cardiovascular disease, diabetes, osteopenia/osteoporosis, and glaucoma.  As appropriate for age/gender, discussed screening for colorectal cancer, prostate cancer, breast cancer, and cervical cancer. Checklist reviewing preventive services available has been given to the patient.    Reviewed patients plan of care and provided an AVS. The Basic Care Plan (routine screening as documented in Health " Maintenance) for Dejan meets the Care Plan requirement. This Care Plan has been established and reviewed with the Patient.    Counseling Resources:  ATP IV Guidelines  Pooled Cohorts Equation Calculator  Breast Cancer Risk Calculator  FRAX Risk Assessment  ICSI Preventive Guidelines  Dietary Guidelines for Americans, 2010  USDA's MyPlate  ASA Prophylaxis  Lung CA Screening    AGUSTIN RUBI PA-C  [facility] for HPI/ROS submitted by the patient on 11/10/2017   PHQ-2 Score: 0

## 2017-11-10 NOTE — MR AVS SNAPSHOT
After Visit Summary   11/10/2017    Dejan Hammer    MRN: [id number]           Patient Information     Date Of Birth          1945        Visit Information        Provider Department      11/10/2017 7:40 AM César Gregory PA-C [facility]        Today's Diagnoses     Routine general medical examination at a health care facility    -  1    Need for prophylactic vaccination against Streptococcus pneumoniae (pneumococcus)          Care Instructions      Preventive Health Recommendations:       Male Ages 65 and over    Yearly exam:             See your health care provider every year in order to  o   Review health changes.   o   Discuss preventive care.    o   Review your medicines if your doctor has prescribed any.    Talk with your health care provider about whether you should have a test to screen for prostate cancer (PSA).    Every 3 years, have a diabetes test (fasting glucose). If you are at risk for diabetes, you should have this test more often.    Every 5 years, have a cholesterol test. Have this test more often if you are at risk for high cholesterol or heart disease.     Every 10 years, have a colonoscopy. Or, have a yearly FIT test (stool test). These exams will check for colon cancer.    Talk to with your health care provider about screening for Abdominal Aortic Aneurysm if you have a family history of AAA or have a history of smoking.  Shots:     Get a flu shot each year.     Get a tetanus shot every 10 years.     Talk to your doctor about your pneumonia vaccines. There are now two you should receive - Pneumovax (PPSV 23) and Prevnar (PCV 13).    Talk to your doctor about a shingles vaccine.     Talk to your doctor about the hepatitis B vaccine.  Nutrition:     Eat at least 5 servings of fruits and vegetables each day.     Eat whole-grain bread, whole-wheat pasta and brown rice instead of white grains and rice.     Talk to your doctor about Calcium and Vitamin D.    Lifestyle    Exercise for at least 150 minutes a week (30 minutes a day, 5 days a week). This will help you control your weight and prevent disease.     Limit alcohol to one drink per day.     No smoking.     Wear sunscreen to prevent skin cancer.     See your dentist every six months for an exam and cleaning.     See your eye doctor every 1 to 2 years to screen for conditions such as glaucoma, macular degeneration and cataracts.          Follow-ups after your visit        Your next 10 appointments already scheduled     Nov 17, 2017  8:15 AM CST   New Visit with Vin Maria MD   [facility]    [address] [place] [id number] [phone]              Who to contact     If you have questions or need follow up information about today's clinic visit or your schedule please contact [facility] directly at [phone].  Normal or non-critical lab and imaging results will be communicated to you by MyChart, letter or phone within 4 business days after the clinic has received the results. If you do not hear from us within 7 days, please contact the clinic through [facility] or phone. If you have a critical or abnormal lab result, we will notify you by phone as soon as possible.  Submit refill requests through [employer] or call your pharmacy and they will forward the refill request to us. Please allow 3 business days for your refill to be completed.          Additional Information About Your Visit        [facility] Information     [employer] gives you secure access to your electronic health record. If you see a primary care provider, you can also send messages to your care team and make appointments. If you have questions, please call your primary care clinic.  If you do not have a primary care provider, please call [phone] and they will assist you.        Care EveryWhere ID     This is your Care EveryWhere ID. This could be used by other  "organizations to access your [facility] medical records  [id number]        Your Vitals Were     Pulse Temperature Height BMI (Body Mass Index)          84 98  F (36.7  C) (Oral) 5' 10.75\" (1.797 m) 27.67 kg/m2         Blood Pressure from Last 3 Encounters:   11/10/17 128/76   07/31/17 138/78   06/23/17 130/82    Weight from Last 3 Encounters:   11/10/17 197 lb (89.4 kg)   07/31/17 205 lb (93 kg)   06/23/17 199 lb (90.3 kg)              We Performed the Following     Pneumococcal vaccine 13 valent PCV13 IM (Prevnar) [52809]          Today's Medication Changes          These changes are accurate as of: 11/10/17  8:27 AM.  If you have any questions, ask your nurse or doctor.               These medicines have changed or have updated prescriptions.        Dose/Directions    vitamin D3 2000 UNITS Caps   This may have changed:  how much to take   Changed by:  César Gregory PA-C        Dose:  2000 Units   Take 2,000 Units by mouth daily   Quantity:  30 capsule   Refills:  0                Primary Care Provider Office Phone # Fax #    César Gregory PA-C [phone]       [address] [place] [address]        Equal Access to Services     BETO VOGT AH: Hadguerrero robleso Soomaali, waaxda luqadaha, qaybta kaalmada adeegyada, waxay idiin misty almanza. So [facility] [phone].    ATENCIÓN: Si habla español, tiene a mercado disposición servicios gratuitos de asistencia lingüística. Llame al [phone].    We comply with applicable federal civil rights laws and [place] laws. We do not discriminate on the basis of race, color, national origin, age, disability, sex, sexual orientation, or gender identity.            Thank you!     Thank you for choosing [facility]  for your care. Our goal is always to provide you with excellent care. Hearing back from our patients is one way we can continue to improve our services. Please take a few minutes to complete the " written survey that you may receive in the mail after your visit with us. Thank you!             Your Updated Medication List - Protect others around you: Learn how to safely use, store and throw away your medicines at www.disposemymeds.org.          This list is accurate as of: 11/10/17  8:27 AM.  Always use your most recent med list.                   Brand Name Dispense Instructions for use Diagnosis    ADVIL 200 MG tablet   Generic drug:  ibuprofen      Take 200 mg by mouth every 4 hours as needed.        aspirin 81 MG tablet      Take by mouth daily        CALCIUM 1000 + D PO      Take 2 tablets by mouth daily.        Fiber Tabs      Take 4 tablets by mouth daily        fish oil-omega-3 fatty acids 1000 MG capsule      Take 2 capsules by mouth daily        garlic 150 MG Tabs tablet      Take 150 mg by mouth daily        MULTIVITAMIN PO           SAW PALMETTO-PUMPKIN SEED OIL PO      Take 1 tablet by mouth daily. Plus Zinc        UNABLE TO FIND      MEDICATION NAME: Relief Factor (natural supplement for pain)        vitamin D3 2000 UNITS Caps     30 capsule    Take 2,000 Units by mouth daily        vitamin E 400 UNIT capsule      Take 400 Units by mouth daily

## 2018-01-23 ENCOUNTER — TELEPHONE (OUTPATIENT)
Dept: OTOLARYNGOLOGY | Facility: CLINIC | Age: 73
End: 2018-01-23

## 2018-01-23 NOTE — TELEPHONE ENCOUNTER
Dr. Riley has a Peer to Peer insurance call scheduled for 1:00PM on 1/25/18 and wants to see if patient can come in at 11:15 or 11:30 instead.  Called and spoke with patient's wife who stated that she had been recently contacted about wanting to reschedule the appointment. She has reached out to the patient and is waiting for his response. She will call the clinic once the patient responds.    Carter Ohara, CMA

## 2018-01-24 ENCOUNTER — OFFICE VISIT (OUTPATIENT)
Dept: OTOLARYNGOLOGY | Facility: CLINIC | Age: 73
End: 2018-01-24
Payer: COMMERCIAL

## 2018-01-24 VITALS — WEIGHT: 199.6 LBS | BODY MASS INDEX: 28.58 KG/M2 | TEMPERATURE: 98.1 F | HEIGHT: 70 IN

## 2018-01-24 DIAGNOSIS — H95.191 ENCOUNTER FOR MASTOIDECTOMY CAVITY DEBRIDEMENT, RIGHT: Primary | ICD-10-CM

## 2018-01-24 PROCEDURE — 99207 ZZC NO CHARGE LOS: CPT | Performed by: OTOLARYNGOLOGY

## 2018-01-24 PROCEDURE — 69220 CLEAN OUT MASTOID CAVITY: CPT | Performed by: OTOLARYNGOLOGY

## 2018-01-24 ASSESSMENT — PAIN SCALES - GENERAL: PAINLEVEL: NO PAIN (0)

## 2018-01-24 NOTE — MR AVS SNAPSHOT
After Visit Summary   1/24/2018    Dejan Hammer    MRN: [id number]           Patient Information     Date Of Birth          1945        Visit Information        Provider Department      1/24/2018 11:30 AM Patrick Riley MD [facility]        Today's Diagnoses     Encounter for mastoidectomy cavity debridement, right    -  1      Care Instructions    General Scheduling Information  To schedule your CT/MRI scan, please contact [place] [facility] at [phone] [address] [place] [address]    To schedule your Surgery, please contact our Specialty Schedulers at [phone]    ENT Clinic Locations Clinic Hours Telephone Number     [facility]  [address]  [place] [address]   Tuesday:       8:00am -- 4:00pm    Wednesday:  8:00am - 4:00pm   To schedule an appointment with   Dr. Riley,   please contact our   Specialty Scheduling Department at:     [phone]       [facility]  [address]   [place] [address]   Friday:          8:00am - 4:00pm         Urgent Care Locations Clinic Hours Telephone Numbers     [facility] [place]  [address] [place] [address]     Monday-Friday:     11:00pm - 9:00pm    Saturday-Sunday:  9:00am - 5:00pm   [phone]     [facility]  [address]   [place] [address]     Monday-Friday:      5:00pm - 9:00pm     Saturday-Sunday:  9:00am - 5:00pm   [phone]                 Follow-ups after your visit        Who to contact     If you have questions or need follow up information about today's clinic visit or your schedule please contact [facility] directly at [phone].  Normal or non-critical lab and imaging results will be communicated to you by MyChart, letter or phone within 4 business days after the clinic has received the results. If you do not hear from us within 7 days, please contact the clinic through MyChart or phone. If you have a critical or abnormal lab  "result, we will notify you by phone as soon as possible.  Submit refill requests through [employer] [facility] or call your pharmacy and they will forward the refill request to us. Please allow 3 business days for your refill to be completed.          Additional Information About Your Visit        [employer] Information     [employer] gives you secure access to your electronic health record. If you see a primary care provider, you can also send messages to your care team and make appointments. If you have questions, please call your primary care clinic.  If you do not have a primary care provider, please call [phone] and they will assist you.        Care EveryWhere ID     This is your Care EveryWhere ID. This could be used by other organizations to access your [place] medical records  [id number]        Your Vitals Were     Temperature Height BMI (Body Mass Index)             98.1  F (36.7  C) (Tympanic) 1.784 m (5' 10.25\") 28.44 kg/m2          Blood Pressure from Last 3 Encounters:   11/10/17 128/76   07/31/17 138/78   06/23/17 130/82    Weight from Last 3 Encounters:   01/24/18 90.5 kg (199 lb 9.6 oz)   11/10/17 89.4 kg (197 lb)   07/31/17 93 kg (205 lb)              We Performed the Following     [facility]        Primary Care Provider Office Phone # Fax #    César Gregory PA-C [phone]       [address] [place] [address]        Equal Access to Services     KAYLAN VOGT : Hadii aad ku hadasho Soomaali, waaxda luqadaha, qaybta kaalmada adeegyada, laquita العراقي . So [facility] [phone].    ATENCIÓN: Si habla español, tiene a mercado disposición servicios gratuitos de asistencia lingüística. Llame al [phone].    We comply with applicable federal civil rights laws and [place] laws. We do not discriminate on the basis of race, color, national origin, age, disability, sex, sexual orientation, or gender identity.            Thank you!     Thank you for choosing " [facility]  for your care. Our goal is always to provide you with excellent care. Hearing back from our patients is one way we can continue to improve our services. Please take a few minutes to complete the written survey that you may receive in the mail after your visit with us. Thank you!             Your Updated Medication List - Protect others around you: Learn how to safely use, store and throw away your medicines at www.disposemymeds.org.          This list is accurate as of 1/24/18 11:58 AM.  Always use your most recent med list.                   Brand Name Dispense Instructions for use Diagnosis    ADVIL 200 MG tablet   Generic drug:  ibuprofen      Take 200 mg by mouth every 4 hours as needed.        aspirin 81 MG tablet      Take by mouth daily        CALCIUM 1000 + D PO      Take 2 tablets by mouth daily.        Fiber Tabs      Take 4 tablets by mouth daily        fish oil-omega-3 fatty acids 1000 MG capsule      Take 2 capsules by mouth daily        garlic 150 MG Tabs tablet      Take 150 mg by mouth daily        MULTIVITAMIN PO           SAW PALMETTO-PUMPKIN SEED OIL PO      Take 1 tablet by mouth daily. Plus Zinc        UNABLE TO FIND      MEDICATION NAME: Relief Factor (natural supplement for pain)        vitamin D3 2000 UNITS Caps     30 capsule    Take 2,000 Units by mouth daily        vitamin E 400 UNIT capsule      Take 400 Units by mouth daily

## 2018-01-24 NOTE — LETTER
1/24/2018         RE: Dejan Hammer  [address]  [place] [address]        Dear Colleague,    Thank you for referring your patient, Dejan Hammer, to the [facility]. Please see a copy of my visit note below.    Chief Complaint - ear wax removal/mastoid cavity cleaning    History of Present Illness - Dejan Hammer is a 72 year old male who presents to me today for ear wax removal in the ears. Has a right mastoid cavity and hasn't had it cleaned in 3 years, maybe longer. He can't remember who cleaned it out in the past. No drainage or pain. Wears hearing aids. The patient denies otalgia. No left ear surgery.    Past Medical History -   Patient Active Problem List   Diagnosis     Diverticulosis of colon     DJD (degenerative joint disease)     CARDIOVASCULAR SCREENING; LDL GOAL LESS THAN 160     DEGENERATIVE ARTHRITIS OF ANKLE - right     Health Care Home     Lower urinary tract symptoms (LUTS)     Epistaxis     BPH (benign prostatic hyperplasia)     Nonspecific abnormal electrocardiogram (ECG) (EKG)     Advanced directives, counseling/discussion       Current Medications -   Current Outpatient Prescriptions:      Cholecalciferol (VITAMIN D3) 2000 UNITS CAPS, Take 2,000 Units by mouth daily, Disp: 30 capsule, Rfl: 0     UNABLE TO FIND, MEDICATION NAME: Relief Factor (natural supplement for pain), Disp: , Rfl:      garlic 150 MG TABS, Take 150 mg by mouth daily, Disp: , Rfl:      Multiple Vitamins-Minerals (MULTIVITAMIN PO), , Disp: , Rfl:      aspirin 81 MG tablet, Take by mouth daily, Disp: , Rfl: 3     vitamin E 400 UNIT capsule, Take 400 Units by mouth daily, Disp: , Rfl:      Calcium Carb-Cholecalciferol (CALCIUM 1000 + D PO), Take 2 tablets by mouth daily., Disp: , Rfl:      SAW PALMETTO-PUMPKIN SEED OIL PO, Take 1 tablet by mouth daily. Plus Zinc, Disp: , Rfl:      Fiber TABS, Take 4 tablets by mouth daily , Disp: , Rfl:      fish oil-omega-3 fatty acids (FISH OIL) 1000 MG capsule, Take 2  capsules by mouth daily , Disp: , Rfl:      ibuprofen (ADVIL) 200 MG tablet, Take 200 mg by mouth every 4 hours as needed., Disp: , Rfl:     Allergies - No Known Allergies    Social History -   Social History     Social History     Marital status:      Spouse name: N/A     Number of children: N/A     Years of education: N/A     Social History Main Topics     Smoking status: Never Smoker     Smokeless tobacco: Never Used      Comment: smoke free household.     Alcohol use No     Drug use: No     Sexual activity: Yes     Partners: Female     Other Topics Concern     Parent/Sibling W/ Cabg, Mi Or Angioplasty Before 65f 55m? No     Social History Narrative       Family History -   Family History   Problem Relation Age of Onset     HEART DISEASE Father      CHF,Pacemaker     Cardiovascular Father      Neurologic Disorder Mother      Parkinson       Physical Exam  General - The patient is in no distress.  Alert and oriented to person and place, answers questions and cooperates with examination appropriately.   Voice and Breathing - The patient was breathing comfortably without the use of accessory muscles. There was no wheezing, stridor, or stertor.  The patients voice was clear and strong.  Ears - The auricles are normal in appearance, no erythema. The right mastoid cavity and ear canal was impacted with cerumen. See below for the procedure. Once the cerumen was removed the right tympanic membrane was medial in the middle ear with minimal middle ear space. It's intact. No granulation in the mastoid or middle ear. Small crust on the facial ridge. no infection.  No fluid or purulence was seen in the external canal or the middle ear. Left ear canal, tympanic membrane and middle ear normal.   Eyes - Extraocular movements intact. Sclera were not icteric or injected.  Neck - Palpation of the occipital, submental, submandibular, internal jugular chain, and supraclavicular nodes did not demonstrate any abnormal lymph  nodes or masses. Parotid glands were without masses.  The trachea was mobile and midline.  Neurological - Cranial nerves 2 through 12 were grossly intact. House-Brackmann grade 1 out of 6 bilaterally.     Mastoid debridement    Physical Exam and Procedure  Ears - On examination of the ears, I found that the right ear and mastoid was impacted with cerumen and debri.  Therefore, I positioned the patient in the examination chair in a semi-supine position. I used the binocular surgical microscope to perform cleaning.  On the right side, I began by using a cerumen loop to gently lift the edges of the cerumen mass away from the walls of the external canal and posterior mastoid cavity.  Once I did this, I was able to pull away fragments of wax and debris with an alligator. I removed all the wax and debri. The mastoid had no granulation, and it was well epithelialized. No moisture. Small scab on the facial ridge. The tympanic membrane was intact, but medial in the middle ear. no sign of perforation or middle ear effusion.    Assessment and Plan - Dejan Hammer is a 72 year old male who presents to me today with right mastoid cavity. I cleaned this. No infection. Continue aids. Return in 1-2 years.     Patrick Riley MD  Otolaryngology  [facility]      Again, thank you for allowing me to participate in the care of your patient.        Sincerely,        Patrick Riley MD

## 2018-01-24 NOTE — PROGRESS NOTES
Chief Complaint - ear wax removal/mastoid cavity cleaning    History of Present Illness - Dejan Hammer is a 72 year old male who presents to me today for ear wax removal in the ears. Has a right mastoid cavity and hasn't had it cleaned in 3 years, maybe longer. He can't remember who cleaned it out in the past. No drainage or pain. Wears hearing aids. The patient denies otalgia. No left ear surgery.    Past Medical History -   Patient Active Problem List   Diagnosis     Diverticulosis of colon     DJD (degenerative joint disease)     CARDIOVASCULAR SCREENING; LDL GOAL LESS THAN 160     DEGENERATIVE ARTHRITIS OF ANKLE - right     Health Care Home     Lower urinary tract symptoms (LUTS)     Epistaxis     BPH (benign prostatic hyperplasia)     Nonspecific abnormal electrocardiogram (ECG) (EKG)     Advanced directives, counseling/discussion       Current Medications -   Current Outpatient Prescriptions:      Cholecalciferol (VITAMIN D3) 2000 UNITS CAPS, Take 2,000 Units by mouth daily, Disp: 30 capsule, Rfl: 0     UNABLE TO FIND, MEDICATION NAME: Relief Factor (natural supplement for pain), Disp: , Rfl:      garlic 150 MG TABS, Take 150 mg by mouth daily, Disp: , Rfl:      Multiple Vitamins-Minerals (MULTIVITAMIN PO), , Disp: , Rfl:      aspirin 81 MG tablet, Take by mouth daily, Disp: , Rfl: 3     vitamin E 400 UNIT capsule, Take 400 Units by mouth daily, Disp: , Rfl:      Calcium Carb-Cholecalciferol (CALCIUM 1000 + D PO), Take 2 tablets by mouth daily., Disp: , Rfl:      SAW PALMETTO-PUMPKIN SEED OIL PO, Take 1 tablet by mouth daily. Plus Zinc, Disp: , Rfl:      Fiber TABS, Take 4 tablets by mouth daily , Disp: , Rfl:      fish oil-omega-3 fatty acids (FISH OIL) 1000 MG capsule, Take 2 capsules by mouth daily , Disp: , Rfl:      ibuprofen (ADVIL) 200 MG tablet, Take 200 mg by mouth every 4 hours as needed., Disp: , Rfl:     Allergies - No Known Allergies    Social History -   Social History     Social History      Marital status:      Spouse name: N/A     Number of children: N/A     Years of education: N/A     Social History Main Topics     Smoking status: Never Smoker     Smokeless tobacco: Never Used      Comment: smoke free household.     Alcohol use No     Drug use: No     Sexual activity: Yes     Partners: Female     Other Topics Concern     Parent/Sibling W/ Cabg, Mi Or Angioplasty Before 65f 55m? No     Social History Narrative       Family History -   Family History   Problem Relation Age of Onset     HEART DISEASE Father      CHF,Pacemaker     Cardiovascular Father      Neurologic Disorder Mother      Parkinson       Physical Exam  General - The patient is in no distress.  Alert and oriented to person and place, answers questions and cooperates with examination appropriately.   Voice and Breathing - The patient was breathing comfortably without the use of accessory muscles. There was no wheezing, stridor, or stertor.  The patients voice was clear and strong.  Ears - The auricles are normal in appearance, no erythema. The right mastoid cavity and ear canal was impacted with cerumen. See below for the procedure. Once the cerumen was removed the right tympanic membrane was medial in the middle ear with minimal middle ear space. It's intact. No granulation in the mastoid or middle ear. Small crust on the facial ridge. no infection.  No fluid or purulence was seen in the external canal or the middle ear. Left ear canal, tympanic membrane and middle ear normal.   Eyes - Extraocular movements intact. Sclera were not icteric or injected.  Neck - Palpation of the occipital, submental, submandibular, internal jugular chain, and supraclavicular nodes did not demonstrate any abnormal lymph nodes or masses. Parotid glands were without masses.  The trachea was mobile and midline.  Neurological - Cranial nerves 2 through 12 were grossly intact. House-Brackmann grade 1 out of 6 bilaterally.     Mastoid debridement    Physical  Exam and Procedure  Ears - On examination of the ears, I found that the right ear and mastoid was impacted with cerumen and debri.  Therefore, I positioned the patient in the examination chair in a semi-supine position. I used the binocular surgical microscope to perform cleaning.  On the right side, I began by using a cerumen loop to gently lift the edges of the cerumen mass away from the walls of the external canal and posterior mastoid cavity.  Once I did this, I was able to pull away fragments of wax and debris with an alligator. I removed all the wax and debri. The mastoid had no granulation, and it was well epithelialized. No moisture. Small scab on the facial ridge. The tympanic membrane was intact, but medial in the middle ear. no sign of perforation or middle ear effusion.    Assessment and Plan - Dejan Hammer is a 72 year old male who presents to me today with right mastoid cavity. I cleaned this. No infection. Continue aids. Return in 1-2 years.     Patrick Riley MD  Otolaryngology  [facility]

## 2018-01-24 NOTE — PATIENT INSTRUCTIONS
General Scheduling Information  To schedule your CT/MRI scan, please contact [place] [facility] at [phone]   [address] [place] [address]    To schedule your Surgery, please contact our Specialty Schedulers at [phone]    ENT Clinic Locations Clinic Hours Telephone Number     Sean Waller  [address] [place] [address]   Tuesday:       8:00am -- 4:00pm    Wednesday:  8:00am - 4:00pm   To schedule an appointment with   Dr. Riley,   please contact our   Specialty Scheduling Department at:     [phone]       Sean Hernandez  [address] [place] [address]   Friday:          8:00am - 4:00pm         Urgent Care Locations Clinic Hours Telephone Numbers     Sean Bojorquez  [address] [place] [address]     Monday-Friday:     11:00pm - 9:00pm    Saturday-Sunday:  9:00am - 5:00pm   [phone]     Sean Hernandez  [address] [place] [address]     Monday-Friday:      5:00pm - 9:00pm     Saturday-Sunday:  9:00am - 5:00pm   [phone]

## 2018-03-16 ENCOUNTER — OFFICE VISIT (OUTPATIENT)
Dept: FAMILY MEDICINE | Facility: CLINIC | Age: 73
End: 2018-03-16
Payer: COMMERCIAL

## 2018-03-16 VITALS
HEART RATE: 74 BPM | TEMPERATURE: 97.8 F | WEIGHT: 206 LBS | SYSTOLIC BLOOD PRESSURE: 117 MMHG | DIASTOLIC BLOOD PRESSURE: 71 MMHG | OXYGEN SATURATION: 97 % | BODY MASS INDEX: 29.35 KG/M2

## 2018-03-16 DIAGNOSIS — S90.822A BLISTER OF HEEL, LEFT, INITIAL ENCOUNTER: ICD-10-CM

## 2018-03-16 DIAGNOSIS — M79.672 PAIN OF LEFT HEEL: Primary | ICD-10-CM

## 2018-03-16 PROCEDURE — 99213 OFFICE O/P EST LOW 20 MIN: CPT | Performed by: NURSE PRACTITIONER

## 2018-03-16 ASSESSMENT — PAIN SCALES - GENERAL: PAINLEVEL: SEVERE PAIN (7)

## 2018-03-16 NOTE — MR AVS SNAPSHOT
After Visit Summary   3/16/2018    Dejan Hammer    MRN: [id number]           Patient Information     Date Of Birth          1945        Visit Information        Provider Department      3/16/2018 9:20 AM Gail Carpenter APRN CNP [facility]        Today's Diagnoses     Pain of left heel    -  1    Blister of heel, left, initial encounter           Follow-ups after your visit        Follow-up notes from your care team     Return if symptoms worsen or fail to improve.      Who to contact     If you have questions or need follow up information about today's clinic visit or your schedule please contact [facility] directly at [phone].  Normal or non-critical lab and imaging results will be communicated to you by MyChart, letter or phone within 4 business days after the clinic has received the results. If you do not hear from us within 7 days, please contact the clinic through [employer] or phone. If you have a critical or abnormal lab result, we will notify you by phone as soon as possible.  Submit refill requests through [employer]post or call your pharmacy and they will forward the refill request to us. Please allow 3 business days for your refill to be completed.          Additional Information About Your Visit        MyChart Information     [employer]outpost gives you secure access to your electronic health record. If you see a primary care provider, you can also send messages to your care team and make appointments. If you have questions, please call your primary care clinic.  If you do not have a primary care provider, please call [phone] and they will assist you.        Care EveryWhere ID     This is your Care EveryWhere ID. This could be used by other organizations to access your [facility] medical records  [id number]        Your Vitals Were     Pulse Temperature Pulse Oximetry BMI (Body Mass Index)          74 97.8  F (36.6  C) (Oral) 97% 29.35 kg/m2          Blood Pressure from Last 3 Encounters:   03/16/18 117/71   11/10/17 128/76   07/31/17 138/78    Weight from Last 3 Encounters:   03/16/18 206 lb (93.4 kg)   01/24/18 199 lb 9.6 oz (90.5 kg)   11/10/17 197 lb (89.4 kg)              Today, you had the following     No orders found for display       Primary Care Provider Office Phone # Fax #    César Gregory PA-C [phone]       [address] [facility] [address]        Equal Access to Services     [facility]: Hadii catrina barbour haddamono Sogadiel, waaxda luqadaha, qaybta kaalmada adeunrulyyaadonis, laquita العراقي . So [facility] [place] [phone].    ATENCIÓN: Si habla español, tiene a mercado disposición servicios gratuitos de asistencia lingüística. [facility] [phone].    We comply with applicable federal civil rights laws and [place] laws. We do not discriminate on the basis of race, color, national origin, age, disability, sex, sexual orientation, or gender identity.            Thank you!     Thank you for choosing [facility]  for your care. Our goal is always to provide you with excellent care. Hearing back from our patients is one way we can continue to improve our services. Please take a few minutes to complete the written survey that you may receive in the mail after your visit with us. Thank you!             Your Updated Medication List - Protect others around you: Learn how to safely use, store and throw away your medicines at www.disposemymeds.org.          This list is accurate as of 3/16/18  1:10 PM.  Always use your most recent med list.                   Brand Name Dispense Instructions for use Diagnosis    ADVIL 200 MG tablet   Generic drug:  ibuprofen      Take 200 mg by mouth every 4 hours as needed.        aspirin 81 MG tablet      Take by mouth daily        CALCIUM 1000 + D PO      Take 2 tablets by mouth daily.        Fiber Tabs      Take 4 tablets by mouth daily        fish oil-omega-3  fatty acids 1000 MG capsule      Take 2 capsules by mouth daily        garlic 150 MG Tabs tablet      Take 150 mg by mouth daily        MULTIVITAMIN PO           SAW PALMETTO-PUMPKIN SEED OIL PO      Take 1 tablet by mouth daily. Plus Zinc        UNABLE TO FIND      MEDICATION NAME: Relief Factor (natural supplement for pain)        vitamin D3 2000 UNITS Caps     30 capsule    Take 2,000 Units by mouth daily        vitamin E 400 UNIT capsule      Take 400 Units by mouth daily
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
## 2019-06-18 ENCOUNTER — OFFICE VISIT (OUTPATIENT)
Dept: FAMILY MEDICINE | Facility: CLINIC | Age: 74
End: 2019-06-18
Payer: COMMERCIAL

## 2019-06-18 VITALS
HEART RATE: 76 BPM | DIASTOLIC BLOOD PRESSURE: 82 MMHG | SYSTOLIC BLOOD PRESSURE: 132 MMHG | BODY MASS INDEX: 28 KG/M2 | WEIGHT: 195.6 LBS | TEMPERATURE: 97.7 F | HEIGHT: 70 IN

## 2019-06-18 DIAGNOSIS — Z00.00 ENCOUNTER FOR MEDICARE ANNUAL WELLNESS EXAM: Primary | ICD-10-CM

## 2019-06-18 DIAGNOSIS — N40.1 BENIGN PROSTATIC HYPERPLASIA WITH LOWER URINARY TRACT SYMPTOMS, SYMPTOM DETAILS UNSPECIFIED: ICD-10-CM

## 2019-06-18 DIAGNOSIS — Z12.5 SCREENING FOR PROSTATE CANCER: ICD-10-CM

## 2019-06-18 PROCEDURE — 82947 ASSAY GLUCOSE BLOOD QUANT: CPT | Performed by: PHYSICIAN ASSISTANT

## 2019-06-18 PROCEDURE — 99397 PER PM REEVAL EST PAT 65+ YR: CPT | Performed by: PHYSICIAN ASSISTANT

## 2019-06-18 PROCEDURE — 80061 LIPID PANEL: CPT | Performed by: PHYSICIAN ASSISTANT

## 2019-06-18 PROCEDURE — G0103 PSA SCREENING: HCPCS | Performed by: PHYSICIAN ASSISTANT

## 2019-06-18 PROCEDURE — 36415 COLL VENOUS BLD VENIPUNCTURE: CPT | Performed by: PHYSICIAN ASSISTANT

## 2019-06-18 ASSESSMENT — ENCOUNTER SYMPTOMS
ABDOMINAL PAIN: 0
PARESTHESIAS: 0
MYALGIAS: 0
NAUSEA: 0
WEAKNESS: 0
CHILLS: 0
NERVOUS/ANXIOUS: 0
FEVER: 0
JOINT SWELLING: 0
CONSTIPATION: 0
DYSURIA: 0
ARTHRALGIAS: 0
EYE PAIN: 0
SHORTNESS OF BREATH: 0
PALPITATIONS: 0
COUGH: 0
SORE THROAT: 0
HEMATOCHEZIA: 0
HEMATURIA: 0
DIZZINESS: 0
DIARRHEA: 0
FREQUENCY: 0
HEARTBURN: 0
HEADACHES: 0

## 2019-06-18 ASSESSMENT — MIFFLIN-ST. JEOR: SCORE: 1645.98

## 2019-06-18 ASSESSMENT — ACTIVITIES OF DAILY LIVING (ADL): CURRENT_FUNCTION: NO ASSISTANCE NEEDED

## 2019-06-18 NOTE — PROGRESS NOTES
"SUBJECTIVE:   Dejan Hammer is a 74 year old male who presents for Preventive Visit.  re you in the first 12 months of your Medicare coverage?  No    Healthy Habits:     In general, how would you rate your overall health?  Excellent    Frequency of exercise:  1 day/week    Duration of exercise:  N/A    Do you usually eat at least 4 servings of fruit and vegetables a day, include whole grains    & fiber and avoid regularly eating high fat or \"junk\" foods?  Yes    Taking medications regularly:  Yes    Medication side effects:  None    Ability to successfully perform activities of daily living:  No assistance needed    Home Safety:  No safety concerns identified    Hearing Impairment:  No hearing concerns    In the past 6 months, have you been bothered by leaking of urine?  No    In general, how would you rate your overall mental or emotional health?  Excellent      PHQ-2 Total Score: 0    Additional concerns today:  No    1. Dad had prostate cancer - concerned about that - would like PSA tested  2. Questions about last colon cancer tests    Do you feel safe in your environment? Yes    Do you have a Health Care Directive? No: Advance care planning was reviewed with patient; patient declined at this time. Patient has a will.       Fall risk  Fallen 2 or more times in the past year?: No  Any fall with injury in the past year?: Yes      Cognitive Screening   1) Repeat 3 items (Leader, Season, Table)    2) Clock draw: NORMAL  3) 3 item recall: Recalls 2 objects   Results: NORMAL clock, 1-2 items recalled: COGNITIVE IMPAIRMENT LESS LIKELY    Mini-CogTM Copyright S Nicolas. Licensed by the author for use in [facility]; reprinted with permission (addis@.[facility]). All rights reserved.      Do you have sleep apnea, excessive snoring or daytime drowsiness?: no. Sometimes daytime drowsiness from no sleep.     Reviewed and updated as needed this visit by clinical staff  Tobacco  Allergies  Meds  Med Hx  Surg Hx  Fam " Hx  Soc Hx        Reviewed and updated as needed this visit by Provider        Social History     Tobacco Use     Smoking status: Never Smoker     Smokeless tobacco: Never Used     Tobacco comment: smoke free household.   Substance Use Topics     Alcohol use: No     If you drink alcohol do you typically have >3 drinks per day or >7 drinks per week? No    Alcohol Use 6/18/2019   Prescreen: >3 drinks/day or >7 drinks/week? Not Applicable   Prescreen: >3 drinks/day or >7 drinks/week? -   No flowsheet data found.      Current providers sharing in care for this patient include:   Patient Care Team:  César Gregory PA-C as PCP - General (Family Practice)  César Gregory PA-C as Assigned PCP    The following health maintenance items are reviewed in Epic and correct as of today:  Health Maintenance   Topic Date Due     ZOSTER IMMUNIZATION (1 of 2) 04/07/1997     MEDICARE ANNUAL WELLNESS VISIT  11/10/2018     PHQ-2  01/01/2019     INFLUENZA VACCINE (Season Ended) 09/01/2019     ADVANCE CARE PLANNING  05/24/2021     LIPID  11/07/2021     COLONOSCOPY  03/27/2022     DTAP/TDAP/TD IMMUNIZATION (4 - Td) 10/17/2023     HEPATITIS C SCREENING  Completed     PNEUMOCOCCAL IMMUNIZATION 65+ LOW/MEDIUM RISK  Completed     AORTIC ANEURYSM SCREENING (SYSTEM ASSIGNED)  Completed     IPV IMMUNIZATION  Aged Out     MENINGITIS IMMUNIZATION  Aged Out     Lab work is in process  Pneumonia Vaccine: Up to date     Review of Systems   Constitutional: Negative for chills and fever.   HENT: Negative for congestion, ear pain, hearing loss and sore throat.    Eyes: Negative for pain and visual disturbance.   Respiratory: Negative for cough and shortness of breath.    Cardiovascular: Negative for chest pain, palpitations and peripheral edema.   Gastrointestinal: Negative for abdominal pain, constipation, diarrhea, heartburn, hematochezia and nausea.   Genitourinary: Negative for discharge, dysuria, frequency, genital sores, hematuria,  "impotence and urgency.   Musculoskeletal: Negative for arthralgias, joint swelling and myalgias.   Skin: Negative for rash.   Neurological: Negative for dizziness, weakness, headaches and paresthesias.   Psychiatric/Behavioral: Negative for mood changes. The patient is not nervous/anxious.          OBJECTIVE:   /82 (BP Location: Right arm, Patient Position: Chair, Cuff Size: Adult Regular)   Pulse 76   Temp 97.7  F (36.5  C) (Oral)   Ht 1.782 m (5' 10.16\")   Wt 88.7 kg (195 lb 9.6 oz)   BMI 27.94 kg/m   Estimated body mass index is 27.94 kg/m  as calculated from the following:    Height as of this encounter: 1.782 m (5' 10.16\").    Weight as of this encounter: 88.7 kg (195 lb 9.6 oz).  Physical Exam  GENERAL: healthy, alert and no distress  EYES: Eyes grossly normal to inspection, PERRL and conjunctivae and sclerae normal  HENT: ear canals and TM's normal, nose and mouth without ulcers or lesions  NECK: no adenopathy, no asymmetry, masses, or scars and thyroid normal to palpation  RESP: lungs clear to auscultation - no rales, rhonchi or wheezes  CV: regular rate and rhythm, normal S1 S2, no S3 or S4, no murmur, click or rub, no peripheral edema and peripheral pulses strong  ABDOMEN: soft, nontender, no hepatosplenomegaly, no masses and bowel sounds normal  RECTAL (male): normal sphincter tone, no rectal masses, prostate normal size, smooth, nontender without nodules or masses  MS: no gross musculoskeletal defects noted, no edema  SKIN: no suspicious lesions or rashes  NEURO: Normal strength and tone, mentation intact and speech normal  BACK: no CVA tenderness, no paralumbar tenderness  PSYCH: mentation appears normal, affect normal/bright  LYMPH: no cervical, supraclavicular, axillary, or inguinal adenopathy    Diagnostic Test Results:  Labs reviewed in Epic    ASSESSMENT / PLAN:   (Z00.00) Encounter for Medicare annual wellness exam  (primary encounter diagnosis)  Comment: Well person   Plan: Lipid panel " "reflex to direct LDL Non-fasting,         Glucose        Diet, exercise, wellness and other preventive recommendations related to health maintenance were discussed.  Follow up as needed for acute issues.  Physical exam in 1 year.     (N40.1) Benign prostatic hyperplasia with lower urinary tract symptoms, symptom details unspecified  Comment:   Plan: Normal Prostate exam     (Z12.5) Screening for prostate cancer  Comment:   Plan: Prostate spec antigen screen        Check labs. Family history of prostate cancer.       End of Life Planning:  Patient currently has an advanced directive: Yes.  Practitioner is supportive of decision.    COUNSELING:  Reviewed preventive health counseling, as reflected in patient instructions    Estimated body mass index is 27.94 kg/m  as calculated from the following:    Height as of this encounter: 1.782 m (5' 10.16\").    Weight as of this encounter: 88.7 kg (195 lb 9.6 oz).       reports that he has never smoked. He has never used smokeless tobacco.      Appropriate preventive services were discussed with this patient, including applicable screening as appropriate for cardiovascular disease, diabetes, osteopenia/osteoporosis, and glaucoma.  As appropriate for age/gender, discussed screening for colorectal cancer, prostate cancer, breast cancer, and cervical cancer. Checklist reviewing preventive services available has been given to the patient.    Reviewed patients plan of care and provided an AVS. The Basic Care Plan (routine screening as documented in Health Maintenance) for Dejan meets the Care Plan requirement. This Care Plan has been established and reviewed with the Patient.    Counseling Resources:  ATP IV Guidelines  Pooled Cohorts Equation Calculator  Breast Cancer Risk Calculator  FRAX Risk Assessment  ICSI Preventive Guidelines  Dietary Guidelines for Americans, 2010  ""'s MyPlate  ASA Prophylaxis  Lung CA Screening    AGUSTIN RUBI PA-C  [facility]" BENNY    Identified Health Risks:

## 2019-06-19 LAB
CHOLEST SERPL-MCNC: 153 MG/DL
GLUCOSE SERPL-MCNC: 90 MG/DL (ref 70–99)
HDLC SERPL-MCNC: 63 MG/DL
LDLC SERPL CALC-MCNC: 82 MG/DL
NONHDLC SERPL-MCNC: 90 MG/DL
PSA SERPL-ACNC: 2.9 UG/L (ref 0–4)
TRIGL SERPL-MCNC: 40 MG/DL

## 2019-07-03 ENCOUNTER — OFFICE VISIT (OUTPATIENT)
Dept: OPHTHALMOLOGY | Facility: CLINIC | Age: 74
End: 2019-07-03
Payer: COMMERCIAL

## 2019-07-03 DIAGNOSIS — H52.4 PRESBYOPIA: Primary | ICD-10-CM

## 2019-07-03 PROCEDURE — 92014 COMPRE OPH EXAM EST PT 1/>: CPT | Performed by: OPHTHALMOLOGY

## 2019-07-03 PROCEDURE — 92015 DETERMINE REFRACTIVE STATE: CPT | Performed by: OPHTHALMOLOGY

## 2019-07-03 ASSESSMENT — SLIT LAMP EXAM - LIDS
COMMENTS: 2+, DERMATOCHALASIS
COMMENTS: 2+, DERMATOCHALASIS

## 2019-07-03 ASSESSMENT — EXTERNAL EXAM - LEFT EYE: OS_EXAM: 2+ BROW PTOSIS, PROLAPSED FAT PADS: LOWER

## 2019-07-03 ASSESSMENT — VISUAL ACUITY
CORRECTION_TYPE: GLASSES
OD_CC: 20/30
OD_CC: J2
METHOD: SNELLEN - LINEAR
OS_CC: J2+
OS_CC: 20/20-2

## 2019-07-03 ASSESSMENT — REFRACTION_MANIFEST
OS_SPHERE: +3.50
OS_ADD: +2.50
OD_AXIS: 007
OD_CYLINDER: +0.75
OD_SPHERE: +3.00
OD_ADD: +2.50
OS_CYLINDER: SPHERE

## 2019-07-03 ASSESSMENT — REFRACTION_WEARINGRX
OD_SPHERE: +2.00
OS_SPHERE: +3.00
OS_CYLINDER: SPHERE
OS_ADD: +2.75
OD_AXIS: 035
OD_CYLINDER: +1.00
OD_ADD: +2.75
SPECS_TYPE: PAL

## 2019-07-03 ASSESSMENT — TONOMETRY
IOP_METHOD: APPLANATION
OS_IOP_MMHG: 18
OD_IOP_MMHG: 16

## 2019-07-03 ASSESSMENT — CONF VISUAL FIELD
OS_NORMAL: 1
OD_NORMAL: 1

## 2019-07-03 ASSESSMENT — CUP TO DISC RATIO
OS_RATIO: 0.1
OD_RATIO: 0.0
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
## 2019-07-03 NOTE — PATIENT INSTRUCTIONS
Glasses Rx given - optional  Possible posterior vitreous detachment (sudden onset large floater and/or flashing lights) both eyes discussed.   Call in March 2020 for an appointment in July 2020 for Complete Exam.    Dr. Drummond [phone]
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
## 2019-10-03 ENCOUNTER — HEALTH MAINTENANCE LETTER (OUTPATIENT)
Age: 74
End: 2019-10-03

## 2020-08-04 ENCOUNTER — OFFICE VISIT (OUTPATIENT)
Dept: OPHTHALMOLOGY | Facility: CLINIC | Age: 75
End: 2020-08-04
Payer: COMMERCIAL

## 2020-08-04 DIAGNOSIS — H35.372 EPIRETINAL MEMBRANE (ERM) OF LEFT EYE: Primary | ICD-10-CM

## 2020-08-04 PROCEDURE — 92134 CPTRZ OPH DX IMG PST SGM RTA: CPT | Performed by: OPHTHALMOLOGY

## 2020-08-04 PROCEDURE — 92012 INTRM OPH EXAM EST PATIENT: CPT | Performed by: OPHTHALMOLOGY

## 2020-08-04 ASSESSMENT — EXTERNAL EXAM - LEFT EYE: OS_EXAM: 2+ BROW PTOSIS, PROLAPSED FAT PADS: LOWER

## 2020-08-04 ASSESSMENT — TONOMETRY
OD_IOP_MMHG: 17
OS_IOP_MMHG: 15
IOP_METHOD: APPLANATION

## 2020-08-04 ASSESSMENT — VISUAL ACUITY
OD_CC+: +2
OD_CC: 20/25
CORRECTION_TYPE: GLASSES
OS_CC+: -2
OS_PH_CC: 20/25
OS_PH_CC+: -3
OS_CC: 20/30
METHOD: SNELLEN - LINEAR

## 2020-08-04 ASSESSMENT — SLIT LAMP EXAM - LIDS
COMMENTS: 2+, DERMATOCHALASIS
COMMENTS: 2+, DERMATOCHALASIS

## 2020-08-04 ASSESSMENT — EXTERNAL EXAM - RIGHT EYE: OD_EXAM: 2+ BROW PTOSIS, PROLAPSED FAT PADS: LOWER

## 2020-08-04 ASSESSMENT — CUP TO DISC RATIO
OS_RATIO: 0.1
OD_RATIO: 0.0

## 2020-08-04 NOTE — PROGRESS NOTES
" Current Eye Medications:  None.       Subjective:  Patient complains of blurry vision in his left eye.  For the past couple months he has noticed gray \"sheet -like\" areas, floating in his vision of his left eye.  For the past few weeks, he has noticed blurry vision in his left eye, especially upon awakening.  Most recently, he has noticed overall blurry vision in his left eye, with an extra blurry area centrally.  No flashing lights, or curtain like loss of vision.  Vision in his right eye is good, with correction.   He has a Comprehensive Eye Exam scheduled on 11-16-20, with Dr. Drummond.       Objective:  See Ophthalmology Exam.       Assessment:  Moderate epiretinal membrane left eye.      Plan:  Continue same glasses.  You have an epiretinal membrane in the left eye which we will monitor.  Possible posterior vitreous detachment (sudden onset large floater and/or flashing lights) both eyes discussed.  Return Visit for Complete Exam and repeat retinal OCT as scheduled.     Anson Drummond M.D.  [phone]       "

## 2020-08-04 NOTE — PATIENT INSTRUCTIONS
Continue same glasses.  You have an epiretinal membrane in the left eye which we will monitor.  Possible posterior vitreous detachment (sudden onset large floater and/or flashing lights) both eyes discussed.  Return Visit for Complete Exam and repeat retinal OCT as scheduled.     Anson Drummond M.D.  [phone]

## 2020-08-04 NOTE — LETTER
"    8/4/2020         RE: Dejan Hammer  [address]  [place] [address]        Dear Colleague,    Thank you for referring your patient, Dejan Hammer, to the [facility]. Please see a copy of my visit note below.     Current Eye Medications:  None.       Subjective:  Patient complains of blurry vision in his left eye.  For the past couple months he has noticed gray \"sheet -like\" areas, floating in his vision of his left eye.  For the past few weeks, he has noticed blurry vision in his left eye, especially upon awakening.  Most recently, he has noticed overall blurry vision in his left eye, with an extra blurry area centrally.  No flashing lights, or curtain like loss of vision.  Vision in his right eye is good, with correction.   He has a Comprehensive Eye Exam scheduled on 11-16-20, with Dr. Drummond.       Objective:  See Ophthalmology Exam.       Assessment:  Moderate epiretinal membrane left eye.      Plan:  Continue same glasses.  You have an epiretinal membrane in the left eye which we will monitor.  Possible posterior vitreous detachment (sudden onset large floater and/or flashing lights) both eyes discussed.  Return Visit for Complete Exam and repeat retinal OCT as scheduled.     Anson Drummond M.D.  [phone]         Again, thank you for allowing me to participate in the care of your patient.        Sincerely,        Anson Drummond MD    "

## 2020-11-07 ENCOUNTER — HEALTH MAINTENANCE LETTER (OUTPATIENT)
Age: 75
End: 2020-11-07

## 2020-11-16 ENCOUNTER — OFFICE VISIT (OUTPATIENT)
Dept: OPHTHALMOLOGY | Facility: CLINIC | Age: 75
End: 2020-11-16
Payer: COMMERCIAL

## 2020-11-16 DIAGNOSIS — H25.813 COMBINED FORMS OF AGE-RELATED CATARACT OF BOTH EYES: ICD-10-CM

## 2020-11-16 DIAGNOSIS — H52.4 PRESBYOPIA: ICD-10-CM

## 2020-11-16 DIAGNOSIS — Z01.01 ENCOUNTER FOR EXAMINATION OF EYES AND VISION WITH ABNORMAL FINDINGS: ICD-10-CM

## 2020-11-16 DIAGNOSIS — H35.372 EPIRETINAL MEMBRANE (ERM) OF LEFT EYE: Primary | ICD-10-CM

## 2020-11-16 PROCEDURE — 92134 CPTRZ OPH DX IMG PST SGM RTA: CPT | Performed by: OPHTHALMOLOGY

## 2020-11-16 PROCEDURE — 92014 COMPRE OPH EXAM EST PT 1/>: CPT | Performed by: OPHTHALMOLOGY

## 2020-11-16 PROCEDURE — 92015 DETERMINE REFRACTIVE STATE: CPT | Performed by: OPHTHALMOLOGY

## 2020-11-16 ASSESSMENT — REFRACTION_MANIFEST
OS_AXIS: 170
OD_SPHERE: +2.50
OS_CYLINDER: +0.25
OD_AXIS: 015
OD_CYLINDER: +1.00
OD_ADD: +2.75
OS_SPHERE: +3.50
OS_ADD: +2.75

## 2020-11-16 ASSESSMENT — VISUAL ACUITY
OS_CC+: -1
METHOD: SNELLEN - LINEAR
CORRECTION_TYPE: GLASSES
OS_CC: 20/25
OD_CC+: -2
OD_CC: 20/20

## 2020-11-16 ASSESSMENT — REFRACTION_WEARINGRX
OD_ADD: +2.75
SPECS_TYPE: PAL
OD_CYLINDER: +1.00
OD_SPHERE: +2.00
OS_ADD: +2.75
OS_CYLINDER: SPHERE
OD_AXIS: 035
OS_SPHERE: +3.00

## 2020-11-16 ASSESSMENT — TONOMETRY
OD_IOP_MMHG: 15
IOP_METHOD: APPLANATION
OS_IOP_MMHG: 15

## 2020-11-16 ASSESSMENT — CONF VISUAL FIELD
METHOD: COUNTING FINGERS
OS_NORMAL: 1
OD_NORMAL: 1

## 2020-11-16 NOTE — LETTER
11/16/2020         RE: Dejan Hammer  [address]  [place] [address]        Dear Colleague,    Thank you for referring your patient, Dejan Hammer, to the [facility]. Please see a copy of my visit note below.     Current Eye Medications:  none     Subjective:  Complete eye exam and OCT. Vision is doing well both eyes in distance. Vision is little blurry reading at near left eye > right eye when first waking up for last 3 months. No eye pain or discomfort in either eye.      Objective:  See Ophthalmology Exam.       Assessment:  Stable mild-moderate epiretinal membrane left eye; mild right eye.  Mild early cataract both eyes.      ICD-10-CM    1. Epiretinal membrane, mod, of left eye  H35.372    2. Combined forms of age-related cataract, mild, of both eyes  H25.813    3. Encounter for examination of eyes and vision with abnormal findings  Z01.01    4. Presbyopia  H52.4 EYE EXAM (SIMPLE-NONBILLABLE)     REFRACTION        Plan:  Glasses Rx given - optional  Possible posterior vitreous detachment (sudden onset large floater and/or flashing lights) both eyes discussed.   Use artificial tears up to 4 times daily both eyes as needed.  (Refresh Tears, Systane Ultra/Balance, or Theratears)  Call in July 2021 for an appointment in November 2021 for Complete Exam    Dr. Drummond [phone]             Again, thank you for allowing me to participate in the care of your patient.        Sincerely,        Anson Drummond MD

## 2020-11-16 NOTE — PROGRESS NOTES
Current Eye Medications:  none     Subjective:  Complete eye exam and OCT. Vision is doing well both eyes in distance. Vision is little blurry reading at near left eye > right eye when first waking up for last 3 months. No eye pain or discomfort in either eye.      Objective:  See Ophthalmology Exam.       Assessment:  Stable mild-moderate epiretinal membrane left eye; mild right eye.  Mild early cataract both eyes.      ICD-10-CM    1. Epiretinal membrane, mod, of left eye  H35.372    2. Combined forms of age-related cataract, mild, of both eyes  H25.813    3. Encounter for examination of eyes and vision with abnormal findings  Z01.01    4. Presbyopia  H52.4 EYE EXAM (SIMPLE-NONBILLABLE)     REFRACTION        Plan:  Glasses Rx given - optional  Possible posterior vitreous detachment (sudden onset large floater and/or flashing lights) both eyes discussed.   Use artificial tears up to 4 times daily both eyes as needed.  (Refresh Tears, Systane Ultra/Balance, or Theratears)  Call in July 2021 for an appointment in November 2021 for Complete Exam    Dr. Drummond [phone]

## 2020-11-16 NOTE — PATIENT INSTRUCTIONS
Glasses Rx given - optional  Possible posterior vitreous detachment (sudden onset large floater and/or flashing lights) both eyes discussed.   Use artificial tears up to 4 times daily both eyes as needed.  (Refresh Tears, Systane Ultra/Balance, or Theratears)  Call in July 2021 for an appointment in November 2021 for Complete Exam    Dr. Drummond [phone]

## 2020-11-17 PROBLEM — H25.813 COMBINED FORMS OF AGE-RELATED CATARACT OF BOTH EYES: Status: ACTIVE | Noted: 2020-11-17

## 2020-11-17 ASSESSMENT — SLIT LAMP EXAM - LIDS
COMMENTS: 2+, DERMATOCHALASIS
COMMENTS: 2+, DERMATOCHALASIS

## 2020-11-17 ASSESSMENT — EXTERNAL EXAM - RIGHT EYE: OD_EXAM: 2+ BROW PTOSIS, PROLAPSED FAT PADS: LOWER

## 2020-11-17 ASSESSMENT — CUP TO DISC RATIO
OS_RATIO: 0.1
OD_RATIO: 0.0

## 2020-11-17 ASSESSMENT — EXTERNAL EXAM - LEFT EYE: OS_EXAM: 2+ BROW PTOSIS, PROLAPSED FAT PADS: LOWER

## 2021-03-17 ENCOUNTER — OFFICE VISIT (OUTPATIENT)
Dept: FAMILY MEDICINE | Facility: CLINIC | Age: 76
End: 2021-03-17
Payer: COMMERCIAL

## 2021-03-17 VITALS
HEIGHT: 70 IN | HEART RATE: 92 BPM | WEIGHT: 206 LBS | SYSTOLIC BLOOD PRESSURE: 132 MMHG | BODY MASS INDEX: 29.49 KG/M2 | DIASTOLIC BLOOD PRESSURE: 72 MMHG | OXYGEN SATURATION: 96 % | TEMPERATURE: 98.2 F

## 2021-03-17 DIAGNOSIS — Z12.5 ENCOUNTER FOR SCREENING FOR MALIGNANT NEOPLASM OF PROSTATE: ICD-10-CM

## 2021-03-17 DIAGNOSIS — Z00.00 ENCOUNTER FOR MEDICARE ANNUAL WELLNESS EXAM: Primary | ICD-10-CM

## 2021-03-17 PROCEDURE — 99397 PER PM REEVAL EST PAT 65+ YR: CPT | Performed by: PHYSICIAN ASSISTANT

## 2021-03-17 ASSESSMENT — MIFFLIN-ST. JEOR: SCORE: 1688.2

## 2021-03-17 NOTE — PATIENT INSTRUCTIONS
Patient Education   Personalized Prevention Plan  You are due for the preventive services outlined below.  Your care team is available to assist you in scheduling these services.  If you have already completed any of these items, please share that information with your care team to update in your medical record.  Health Maintenance Due   Topic Date Due     COVID-19 Vaccine (1 of 2) Never done     Zoster (Shingles) Vaccine (1 of 2) Never done     FALL RISK ASSESSMENT  06/18/2020     Flu Vaccine (1) Never done     PHQ-2  01/01/2021

## 2021-03-17 NOTE — PROGRESS NOTES
"  SUBJECTIVE:   Dejan Hammer is a 75 year old male who presents for Preventive Visit.    Patient has been advised of split billing requirements and indicates understanding: Yes  Are you in the first 12 months of your Medicare Part B coverage?  No    Physical Health:    In general, how would you rate your overall physical health? excellent    Outside of work, how many days during the week do you exercise? 6-7 days/week    Outside of work, approximately how many minutes a day do you exercise?15-30 minutes    If you drink alcohol do you typically have >3 drinks per day or >7 drinks per week? No    Do you usually eat at least 4 servings of fruit and vegetables a day, include whole grains & fiber and avoid regularly eating high fat or \"junk\" foods? Yes, lots of fruit in AM and veggies with dinner    Do you have any problems taking medications regularly?  No    Do you have any side effects from medications? none    Needs assistance for the following daily activities: no assistance needed    Which of the following safety concerns are present in your home?  none identified     Hearing impairment: Yes, hearing aids bilateral     In the past 6 months, have you been bothered by leaking of urine? No, but when urinating it can be extended. Flow is a little less.     Mental Health:    In general, how would you rate your overall mental or emotional health? excellent  PHQ-2 Score:      Do you feel safe in your environment? Yes    Have you ever done Advance Care Planning? (For example, a Health Directive, POLST, or a discussion with a medical provider or your loved ones about your wishes): Yes, advance care planning is on file.    Additional concerns to address?    Colonospy order. Had three years ago and polyp was found.   Fall risk:  Fallen 2 or more times in the past year?: No  Any fall with injury in the past year?: No    Cognitive Screenin) Repeat 3 items (Leader, Season, Table)    2) Clock draw: NORMAL  3) 3 item recall: " Recalls 1 object   Results: NORMAL clock, 1-2 items recalled: COGNITIVE IMPAIRMENT LESS LIKELY    Mini-CogTM Copyright S Nicolas. Licensed by the author for use in [facility]; reprinted with permission (addis@.[facility]). All rights reserved.      Do you have sleep apnea, excessive snoring or daytime drowsiness?: no      Reviewed and updated as needed this visit by clinical staff  Tobacco  Allergies   Problems             Reviewed and updated as needed this visit by Provider     Problems            Social History     Tobacco Use     Smoking status: Never Smoker     Smokeless tobacco: Never Used     Tobacco comment: smoke free household.   Substance Use Topics     Alcohol use: No                           Current providers sharing in care for this patient include:   Patient Care Team:  César Gregory PA-C as PCP - General (Family Practice)  César Gregory PA-C as Assigned PCP  Anson Drummond MD as Assigned Surgical Provider    The following health maintenance items are reviewed in Epic and correct as of today:  Health Maintenance   Topic Date Due     COVID-19 Vaccine (1 of 2) Never done     ZOSTER IMMUNIZATION (1 of 2) Never done     FALL RISK ASSESSMENT  06/18/2020     INFLUENZA VACCINE (1) Never done     PHQ-2  01/01/2021     MEDICARE ANNUAL WELLNESS VISIT  03/17/2022     COLORECTAL CANCER SCREENING  03/27/2022     DTAP/TDAP/TD IMMUNIZATION (4 - Td) 10/17/2023     LIPID  06/18/2024     ADVANCE CARE PLANNING  03/17/2026     HEPATITIS C SCREENING  Completed     Pneumococcal Vaccine: 65+ Years  Completed     AORTIC ANEURYSM SCREENING (SYSTEM ASSIGNED)  Completed     Pneumococcal Vaccine: Pediatrics (0 to 5 Years) and At-Risk Patients (6 to 64 Years)  Aged Out     IPV IMMUNIZATION  Aged Out     MENINGITIS IMMUNIZATION  Aged Out     HEPATITIS B IMMUNIZATION  Aged Out     Lab work is in process  Pneumonia Vaccine: Up to date     ROS:  Constitutional, HEENT, cardiovascular, pulmonary, GI, ,  "musculoskeletal, neuro, skin, endocrine and psych systems are negative, except as otherwise noted.    OBJECTIVE:   /72   Pulse 92   Temp 98.2  F (36.8  C) (Oral)   Ht 1.782 m (5' 10.16\")   Wt 93.4 kg (206 lb)   SpO2 96%   BMI 29.42 kg/m   Estimated body mass index is 29.42 kg/m  as calculated from the following:    Height as of this encounter: 1.782 m (5' 10.16\").    Weight as of this encounter: 93.4 kg (206 lb).  EXAM:   GENERAL: healthy, alert and no distress  EYES: Eyes grossly normal to inspection, PERRL and conjunctivae and sclerae normal  HENT: ear canals and TM's normal, nose and mouth without ulcers or lesions  NECK: no adenopathy, no asymmetry, masses, or scars and thyroid normal to palpation  RESP: lungs clear to auscultation - no rales, rhonchi or wheezes  CV: regular rate and rhythm, normal S1 S2, no S3 or S4, no murmur, click or rub, no peripheral edema and peripheral pulses strong  ABDOMEN: soft, nontender, no hepatosplenomegaly, no masses and bowel sounds normal  MS: no gross musculoskeletal defects noted, no edema  SKIN: no suspicious lesions or rashes  NEURO: Normal strength and tone, mentation intact and speech normal  PSYCH: mentation appears normal, affect normal/bright  LYMPH: no cervical, supraclavicular, axillary, or inguinal adenopathy    Diagnostic Test Results:  Labs reviewed in Epic    ASSESSMENT / PLAN:   (Z00.00) Encounter for Medicare annual wellness exam  (primary encounter diagnosis)  Comment: Well person   Plan: PSA, screen, Basic metabolic panel  (Ca, Cl,         CO2, Creat, Gluc, K, Na, BUN), Lipid panel         reflex to direct LDL Fasting        Diet, exercise, wellness and other preventive recommendations related to health maintenance were discussed.  Follow up as needed for acute issues.  Physical exam in 1 year.     (Z12.5) Encounter for screening for malignant neoplasm of prostate   Comment:   Plan: PSA, screen          Patient has been advised of split billing " "requirements and indicates understanding: Yes    COUNSELING:  Reviewed preventive health counseling, as reflected in patient instructions    Estimated body mass index is 29.42 kg/m  as calculated from the following:    Height as of this encounter: 1.782 m (5' 10.16\").    Weight as of this encounter: 93.4 kg (206 lb).    He reports that he has never smoked. He has never used smokeless tobacco.    Appropriate preventive services were discussed with this patient, including applicable screening as appropriate for cardiovascular disease, diabetes, osteopenia/osteoporosis, and glaucoma.  As appropriate for age/gender, discussed screening for colorectal cancer, prostate cancer, breast cancer, and cervical cancer. Checklist reviewing preventive services available has been given to the patient.    Reviewed patients plan of care and provided an AVS. The Basic Care Plan (routine screening as documented in Health Maintenance) for Dejan meets the Care Plan requirement. This Care Plan has been established and reviewed with the Patient.    Counseling Resources:  ATP IV Guidelines  Pooled Cohorts Equation Calculator  Breast Cancer Risk Calculator  BRCA-Related Cancer Risk Assessment: FHS-7 Tool  FRAX Risk Assessment  ICSI Preventive Guidelines  Dietary Guidelines for Americans, 2010  USDA's MyPlate  ASA Prophylaxis  Lung CA Screening    [facility]  "

## 2021-03-29 DIAGNOSIS — Z12.5 ENCOUNTER FOR SCREENING FOR MALIGNANT NEOPLASM OF PROSTATE: ICD-10-CM

## 2021-03-29 DIAGNOSIS — Z00.00 ENCOUNTER FOR MEDICARE ANNUAL WELLNESS EXAM: ICD-10-CM

## 2021-03-29 LAB
ANION GAP SERPL CALCULATED.3IONS-SCNC: 6 MMOL/L (ref 3–14)
BUN SERPL-MCNC: 16 MG/DL (ref 7–30)
CALCIUM SERPL-MCNC: 9.2 MG/DL (ref 8.5–10.1)
CHLORIDE SERPL-SCNC: 107 MMOL/L (ref 94–109)
CHOLEST SERPL-MCNC: 161 MG/DL
CO2 SERPL-SCNC: 29 MMOL/L (ref 20–32)
CREAT SERPL-MCNC: 1.2 MG/DL (ref 0.66–1.25)
GFR SERPL CREATININE-BSD FRML MDRD: 59 ML/MIN/{1.73_M2}
GLUCOSE SERPL-MCNC: 100 MG/DL (ref 70–99)
HDLC SERPL-MCNC: 63 MG/DL
LDLC SERPL CALC-MCNC: 89 MG/DL
NONHDLC SERPL-MCNC: 98 MG/DL
POTASSIUM SERPL-SCNC: 4.6 MMOL/L (ref 3.4–5.3)
PSA SERPL-ACNC: 3.33 UG/L (ref 0–4)
SODIUM SERPL-SCNC: 142 MMOL/L (ref 133–144)
TRIGL SERPL-MCNC: 43 MG/DL

## 2021-03-29 PROCEDURE — 80048 BASIC METABOLIC PNL TOTAL CA: CPT | Performed by: PHYSICIAN ASSISTANT

## 2021-03-29 PROCEDURE — 80061 LIPID PANEL: CPT | Performed by: PHYSICIAN ASSISTANT

## 2021-03-29 PROCEDURE — 36415 COLL VENOUS BLD VENIPUNCTURE: CPT | Performed by: PHYSICIAN ASSISTANT

## 2021-03-29 PROCEDURE — G0103 PSA SCREENING: HCPCS | Performed by: PHYSICIAN ASSISTANT

## 2021-06-22 ENCOUNTER — ANCILLARY PROCEDURE (OUTPATIENT)
Dept: GENERAL RADIOLOGY | Facility: CLINIC | Age: 76
End: 2021-06-22
Attending: PHYSICIAN ASSISTANT
Payer: COMMERCIAL

## 2021-06-22 ENCOUNTER — OFFICE VISIT (OUTPATIENT)
Dept: FAMILY MEDICINE | Facility: CLINIC | Age: 76
End: 2021-06-22
Payer: COMMERCIAL

## 2021-06-22 VITALS
OXYGEN SATURATION: 98 % | TEMPERATURE: 98.1 F | BODY MASS INDEX: 28.63 KG/M2 | HEIGHT: 70 IN | SYSTOLIC BLOOD PRESSURE: 126 MMHG | DIASTOLIC BLOOD PRESSURE: 74 MMHG | HEART RATE: 74 BPM | WEIGHT: 200 LBS

## 2021-06-22 DIAGNOSIS — M21.41 PES PLANUS OF RIGHT FOOT: ICD-10-CM

## 2021-06-22 DIAGNOSIS — M25.571 PAIN IN JOINT, ANKLE AND FOOT, RIGHT: Primary | ICD-10-CM

## 2021-06-22 PROCEDURE — 99213 OFFICE O/P EST LOW 20 MIN: CPT | Performed by: PHYSICIAN ASSISTANT

## 2021-06-22 PROCEDURE — 73610 X-RAY EXAM OF ANKLE: CPT | Mod: RT | Performed by: RADIOLOGY

## 2021-06-22 ASSESSMENT — MIFFLIN-ST. JEOR: SCORE: 1655.98

## 2021-06-22 NOTE — PROGRESS NOTES
Assessment & Plan     Pain in joint, ankle and foot, right  Probably arthritis  Recommend topical and home therapies  Refer to podiatry if not helping  Arch supports may help   - XR Ankle Right G/E 3 Views  - Orthopedic  Referral; Future  - diclofenac (VOLTAREN) 1 % topical gel; Apply 2 g topically 4 times daily    (M21.41) Pes planus of right foot  Comment:   Plan: Inserts recommended    Return in about 2 weeks (around 7/6/2021) for Update me Via My Chart - No Charge.    AGUSTIN RUBI PA-C  [facility]    Arielle Hammer is a 76 year old who presents for the following health issues     HPI     Musculoskeletal problem/pain  Onset/Duration: right ankle swelling and discomfort ongoing for 4 months, hurts when puts weight on it, hard to go down stairs, can hardly walk in the morning or night to go to the bathroom.  Description  Location: ankles - bilateral  Joint Swelling: YES  Redness: no  Pain: YES, thinks it nerve pain because pain radiates from ankles to toe  Warmth: no  Intensity:  Mild, pain when he puts weight on it, limps to walk  Progression of Symptoms:  worsening  Accompanying signs and symptoms:   Fevers: no  Numbness/tingling/weakness: no  History  Trauma to the area: no  Recent illness:  no  Previous similar problem: no  Previous evaluation:  no  Precipitating or alleviating factors:  Aggravating factors include: walking and climbing stairs  Therapies tried and outcome: Advil helps, tries to elevate in the morning    Patient Active Problem List   Diagnosis     Diverticulosis of colon     DJD (degenerative joint disease)     CARDIOVASCULAR SCREENING; LDL GOAL LESS THAN 160     DEGENERATIVE ARTHRITIS OF ANKLE - right     Health Care Home     Lower urinary tract symptoms (LUTS)     Epistaxis     BPH (benign prostatic hyperplasia)     Nonspecific abnormal electrocardiogram (ECG) (EKG)     Advanced directives, counseling/discussion     Epiretinal membrane, mod, of left  "eye     Combined forms of age-related cataract, mild, of both eyes      Current Outpatient Medications   Medication     aspirin 81 MG tablet     Calcium Carb-Cholecalciferol (CALCIUM 1000 + D PO)     Cholecalciferol (VITAMIN D3) 2000 UNITS CAPS     diclofenac (VOLTAREN) 1 % topical gel     Fiber TABS     fish oil-omega-3 fatty acids (FISH OIL) 1000 MG capsule     FOLIC ACID PO     ibuprofen (ADVIL) 200 MG tablet     MAGNESIUM PO     Multiple Vitamins-Minerals (MULTIVITAMIN PO)     SAW PALMETTO-PUMPKIN SEED OIL PO     Turmeric (QC TUMERIC COMPLEX PO)     vitamin E 400 UNIT capsule     garlic 150 MG TABS     UNABLE TO FIND     No current facility-administered medications for this visit.         Review of Systems   Constitutional, HEENT, cardiovascular, pulmonary, gi and gu systems are negative, except as otherwise noted.      Objective    /74 (BP Location: Right arm, Patient Position: Chair, Cuff Size: Adult Regular)   Pulse 74   Temp 98.1  F (36.7  C) (Oral)   Ht 1.782 m (5' 10.16\")   Wt 90.7 kg (200 lb)   SpO2 98%   BMI 28.57 kg/m    Body mass index is 28.57 kg/m .  Physical Exam   GENERAL: healthy, alert and no distress  MUSC: Pes planus noted. Painful medial lower malleolus. ROM is stiff. Non tender exam otherwise. Mild swelling of the medial ankle structures. Foot exam normal     X ray shows some arthritic changes of the ankle joint         "

## 2021-06-22 NOTE — PATIENT INSTRUCTIONS
1. Voltaren Gel - Arthritis Med - Apply to the area 4 times a day    2. Soak feet in hot then cold water (Contrast Baths) 30 seconds / 30 seconds cold for 5 minutes - daily

## 2021-07-08 ENCOUNTER — OFFICE VISIT (OUTPATIENT)
Dept: PODIATRY | Facility: CLINIC | Age: 76
End: 2021-07-08
Attending: PHYSICIAN ASSISTANT
Payer: COMMERCIAL

## 2021-07-08 VITALS — HEART RATE: 86 BPM | DIASTOLIC BLOOD PRESSURE: 70 MMHG | SYSTOLIC BLOOD PRESSURE: 160 MMHG

## 2021-07-08 DIAGNOSIS — M85.679 BONE CYST OF FOOT: ICD-10-CM

## 2021-07-08 DIAGNOSIS — M21.6X1 PRONATION OF BOTH FEET: Primary | ICD-10-CM

## 2021-07-08 DIAGNOSIS — M76.821 POSTERIOR TIBIAL TENDINITIS, RIGHT: ICD-10-CM

## 2021-07-08 DIAGNOSIS — M21.6X2 PRONATION OF BOTH FEET: Primary | ICD-10-CM

## 2021-07-08 PROCEDURE — 99203 OFFICE O/P NEW LOW 30 MIN: CPT | Performed by: PODIATRIST
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
## 2021-07-08 NOTE — PATIENT INSTRUCTIONS
We wish you continued good healing. If you have any questions or concerns, please do not hesitate to contact us at [phone]    [email or web address] (secure e-mail communication and access to your chart) to send a message or to make an appointment.    Please remember to call and schedule a follow up appointment if one was recommended at your earliest convenience.     +++OF MARCH 2020+++ LOCATION AND HOURS HAVE CHANGED    PLEASE CALL CLINICS TO VERIFY DAYS AND TIMES  PODIATRY CLINIC HOURS  TELEPHONE NUMBER    Dr. Hola CARRILLOPAUNG [facility]        Clinics:  [facility]   Tuesday 1PM-6PM  [place]  Wednesday 745AM-330PM  [place]  Thursday/Friday 745AM-230PM  [place] [facility] APPOINTMENTS  [phone]    [place] APPOINTMENTS  [phone]          If you need a medication refill, please contact us you may need lab work and/or a follow up visit prior to your refill (i.e. Antifungal medications).    If MRI needed please call Imaging at [phone] or [phone]    HOW DO I GET MY KNEE SCOOTER? Knee scooters can be picked up at ANY Medical Supply stores with your knee scooter Prescription.  OR    Bring your signed prescription to an [facility] showroom.
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
## 2021-07-15 ENCOUNTER — TRANSFERRED RECORDS (OUTPATIENT)
Dept: HEALTH INFORMATION MANAGEMENT | Facility: CLINIC | Age: 76
End: 2021-07-15

## 2021-07-16 ENCOUNTER — ANCILLARY PROCEDURE (OUTPATIENT)
Dept: MRI IMAGING | Facility: CLINIC | Age: 76
End: 2021-07-16
Attending: PODIATRIST
Payer: COMMERCIAL

## 2021-07-16 DIAGNOSIS — M76.821 POSTERIOR TIBIAL TENDINITIS, RIGHT: ICD-10-CM

## 2021-07-16 DIAGNOSIS — M85.679 BONE CYST OF FOOT: ICD-10-CM

## 2021-07-16 PROCEDURE — 73721 MRI JNT OF LWR EXTRE W/O DYE: CPT | Mod: RT | Performed by: RADIOLOGY

## 2021-08-05 ENCOUNTER — TRANSFERRED RECORDS (OUTPATIENT)
Dept: HEALTH INFORMATION MANAGEMENT | Facility: CLINIC | Age: 76
End: 2021-08-05

## 2021-08-06 ENCOUNTER — OFFICE VISIT (OUTPATIENT)
Dept: PODIATRY | Facility: CLINIC | Age: 76
End: 2021-08-06
Payer: COMMERCIAL

## 2021-08-06 VITALS — DIASTOLIC BLOOD PRESSURE: 80 MMHG | SYSTOLIC BLOOD PRESSURE: 147 MMHG | HEART RATE: 88 BPM

## 2021-08-06 DIAGNOSIS — M21.6X2 PRONATION OF BOTH FEET: Primary | ICD-10-CM

## 2021-08-06 DIAGNOSIS — M21.6X1 PRONATION OF BOTH FEET: Primary | ICD-10-CM

## 2021-08-06 DIAGNOSIS — M19.071 ARTHRITIS OF ANKLE, RIGHT: ICD-10-CM

## 2021-08-06 DIAGNOSIS — M76.821 POSTERIOR TIBIAL TENDINITIS, RIGHT: ICD-10-CM

## 2021-08-06 PROCEDURE — 99213 OFFICE O/P EST LOW 20 MIN: CPT | Performed by: PODIATRIST

## 2021-08-06 NOTE — PATIENT INSTRUCTIONS
We wish you continued good healing. If you have any questions or concerns, please do not hesitate to contact us at [phone]    [employer] (secure e-mail communication and access to your chart) to send a message or to make an appointment.    Please remember to call and schedule a follow up appointment if one was recommended at your earliest convenience.     +++OF MARCH 2020+++ LOCATION AND HOURS HAVE CHANGED    PLEASE CALL CLINICS TO VERIFY DAYS AND TIMES  PODIATRY CLINIC HOURS  TELEPHONE NUMBER    Dr. Hola CARRILLOPAUNG [facility]        Clinics:  [facility]   Tuesday 1PM-6PM  [place]  Wednesday 745AM-330PM  [place]  Thursday/Friday 745AM-230PM  [place] [facility]POINT[facility]  [phone]    [place] APPOINTMENTS  [phone]          If you need a medication refill, please contact us you may need lab work and/or a follow up visit prior to your refill (i.e. Antifungal medications).    If MRI needed please call Imaging at [phone] or [phone]    HOW DO I GET MY KNEE SCOOTER? Knee scooters can be picked up at ANY Medical Supply stores with your knee scooter Prescription.  OR    Bring your signed prescription to an [facility] showroom.
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
## 2021-08-10 ENCOUNTER — TELEPHONE (OUTPATIENT)
Dept: PODIATRY | Facility: CLINIC | Age: 76
End: 2021-08-10

## 2021-08-10 NOTE — TELEPHONE ENCOUNTER
Reason for call:  Order   Order or referral being requested: Podiatry/ cortisone injections  Reason for request: Patient said the provider was referring/ ordering this for him  Date needed: as soon as possible  Has the patient been seen by the PCP for this problem? Not Applicable    Additional comments: Please contact patient back and updated him on this order.     Phone number to reach patient:  Cell number on file:    Telephone Information:   Mobile [phone]       Best Time:      Can we leave a detailed message on this number?  YES    Travel screening: Not Applicable

## 2021-08-10 NOTE — TELEPHONE ENCOUNTER
Last office visit note states:    We wrote a prescription for a radiographically guided injection.     Was this given to the patient?    Jey MCNEIL RN....8/10/2021 1:30 PM

## 2021-08-10 NOTE — CONFIDENTIAL NOTE
Jaquelin states he has not heard from the Schedulers on getting his ankle injection set up.  Orders are placed in Epic 8/6/21    Gave the number to the pt to contact the Ortho  to help set  Up appointment    Sherrie Garcia CMA

## 2021-08-24 ENCOUNTER — MYC MEDICAL ADVICE (OUTPATIENT)
Dept: FAMILY MEDICINE | Facility: CLINIC | Age: 76
End: 2021-08-24

## 2021-08-24 DIAGNOSIS — M25.571 PAIN IN JOINT, ANKLE AND FOOT, RIGHT: ICD-10-CM

## 2021-08-24 NOTE — TELEPHONE ENCOUNTER
Called patient's pharmacy, has 1 remaining refill.  Sent patient [employer] message that he has a prescription waiting at the pharmacy.        Kelly Rao RN

## 2021-09-05 ENCOUNTER — HEALTH MAINTENANCE LETTER (OUTPATIENT)
Age: 76
End: 2021-09-05

## 2021-09-15 ENCOUNTER — OFFICE VISIT (OUTPATIENT)
Dept: ORTHOPEDICS | Facility: CLINIC | Age: 76
End: 2021-09-15
Attending: PODIATRIST
Payer: COMMERCIAL

## 2021-09-15 VITALS — BODY MASS INDEX: 29.35 KG/M2 | HEIGHT: 70 IN | WEIGHT: 205 LBS

## 2021-09-15 DIAGNOSIS — M19.071 ARTHRITIS OF ANKLE, RIGHT: ICD-10-CM

## 2021-09-15 PROCEDURE — 20606 DRAIN/INJ JOINT/BURSA W/US: CPT | Mod: RT | Performed by: FAMILY MEDICINE

## 2021-09-15 RX ADMIN — ROPIVACAINE HYDROCHLORIDE 2 ML: 5 INJECTION, SOLUTION EPIDURAL; INFILTRATION; PERINEURAL at 17:35

## 2021-09-15 RX ADMIN — TRIAMCINOLONE ACETONIDE 40 MG: 40 INJECTION, SUSPENSION INTRA-ARTICULAR; INTRAMUSCULAR at 17:35

## 2021-09-15 ASSESSMENT — MIFFLIN-ST. JEOR: SCORE: 1678.66

## 2021-09-15 NOTE — PROGRESS NOTES
Dejan Hammer  :  1945  DOS: 9/15/2021  MRN: [id number]    [facility] Medicine Clinic Procedure    Ultrasound Guided Right Intra-Articular Ankle Injection    Clinical History: Gradual onset of right ankle pain over the past ~ 12+ months.  He has been using ankle brace with only moderate improvement.    Diagnosis:   1. Arthritis of ankle, right      Referring Physician: NATALIO Nascimento Joint Injection/Arthrocentesis: R ankle    Date/Time: 9/15/2021 5:35 PM  Performed by: Aba Mello DO  Authorized by: Aba Mello DO     Indications:  Pain  Needle Size:  25 G  Guidance: ultrasound    Approach:  Anterolateral  Location:  Ankle  Site:  R ankle  Medications:  40 mg triamcinolone 40 MG/ML; 2 mL ropivacaine 5 MG/ML  Outcome:  Tolerated well, no immediate complications  Procedure discussed: discussed risks, benefits, and alternatives    Consent Given by:  Patient  Timeout: timeout called immediately prior to procedure    Prep: patient was prepped and draped in usual sterile fashion          Impression:  Successful Right intra-articular ankle injection.    Plan:  Follow with Dr Deutsch as directed  Expectations and goals of CSI reviewed  Often 2-3 days for steroid effect, and can take up to two weeks for maximum effect  We discussed modified progressive pain-free activity as tolerated  Do not overuse in first two weeks if feeling better due to concern for vulnerability while steroid is working  Supportive care reviewed  All questions were answered today  Contact us with additional questions or concerns  Signs and sx of concern reviewed      Aba Mello DO, CAQ  Primary Care Sports Medicine  [facility]

## 2021-09-15 NOTE — LETTER
9/15/2021         RE: Dejan Hammer  [address] [place] [address]        Dear Colleague,    Thank you for referring your patient, Dejan Hammer, to the [facility]. Please see a copy of my visit note below.    Dejan Hammer  :  1945  DOS: 9/15/2021  MRN: [id number]    Sports Medicine Clinic Procedure    Ultrasound Guided Right Intra-Articular Ankle Injection    Clinical History: Gradual onset of right ankle pain over the past ~ 12+ months.  He has been using ankle brace with only moderate improvement.    Diagnosis:   1. Arthritis of ankle, right      Referring Physician: NATALIO Nascimento Joint Injection/Arthrocentesis: R ankle    Date/Time: 9/15/2021 5:35 PM  Performed by: Aba Mello DO  Authorized by: Aba Mello DO     Indications:  Pain  Needle Size:  25 G  Guidance: ultrasound    Approach:  Anterolateral  Location:  Ankle  Site:  R ankle  Medications:  40 mg triamcinolone 40 MG/ML; 2 mL ropivacaine 5 MG/ML  Outcome:  Tolerated well, no immediate complications  Procedure discussed: discussed risks, benefits, and alternatives    Consent Given by:  Patient  Timeout: timeout called immediately prior to procedure    Prep: patient was prepped and draped in usual sterile fashion          Impression:  Successful Right intra-articular ankle injection.    Plan:  Follow with Dr Deutsch as directed  Expectations and goals of CSI reviewed  Often 2-3 days for steroid effect, and can take up to two weeks for maximum effect  We discussed modified progressive pain-free activity as tolerated  Do not overuse in first two weeks if feeling better due to concern for vulnerability while steroid is working  Supportive care reviewed  All questions were answered today  Contact us with additional questions or concerns  Signs and sx of concern reviewed      Aba Mello DO, CAQ  Primary Care Sports Medicine  [facility]            Again, thank you for allowing me to participate in the care of your patient.        Sincerely,        Aba Mello, DO

## 2021-09-20 RX ORDER — ROPIVACAINE HYDROCHLORIDE 5 MG/ML
2 INJECTION, SOLUTION EPIDURAL; INFILTRATION; PERINEURAL
Status: SHIPPED | OUTPATIENT
Start: 2021-09-15

## 2021-09-20 RX ORDER — TRIAMCINOLONE ACETONIDE 40 MG/ML
40 INJECTION, SUSPENSION INTRA-ARTICULAR; INTRAMUSCULAR
Status: SHIPPED | OUTPATIENT
Start: 2021-09-15

## 2022-03-30 ENCOUNTER — OFFICE VISIT (OUTPATIENT)
Dept: OPHTHALMOLOGY | Facility: CLINIC | Age: 77
End: 2022-03-30
Payer: COMMERCIAL

## 2022-03-30 DIAGNOSIS — H25.813 COMBINED FORMS OF AGE-RELATED CATARACT OF BOTH EYES: Primary | ICD-10-CM

## 2022-03-30 DIAGNOSIS — H52.4 PRESBYOPIA: ICD-10-CM

## 2022-03-30 DIAGNOSIS — H35.373 EPIRETINAL MEMBRANE (ERM) OF BOTH EYES: ICD-10-CM

## 2022-03-30 PROBLEM — H35.372 EPIRETINAL MEMBRANE (ERM) OF LEFT EYE: Status: RESOLVED | Noted: 2020-08-04 | Resolved: 2022-03-30

## 2022-03-30 PROCEDURE — 92015 DETERMINE REFRACTIVE STATE: CPT | Performed by: OPHTHALMOLOGY

## 2022-03-30 PROCEDURE — 92134 CPTRZ OPH DX IMG PST SGM RTA: CPT | Performed by: OPHTHALMOLOGY

## 2022-03-30 PROCEDURE — 92014 COMPRE OPH EXAM EST PT 1/>: CPT | Performed by: OPHTHALMOLOGY

## 2022-03-30 ASSESSMENT — TONOMETRY
OS_IOP_MMHG: 15
IOP_METHOD: APPLANATION
OD_IOP_MMHG: 13

## 2022-03-30 ASSESSMENT — REFRACTION_WEARINGRX
OS_CYLINDER: SPHERE
OD_AXIS: 035
OS_ADD: +2.75
OD_ADD: +2.75
OS_SPHERE: +3.00
SPECS_TYPE: PAL
OD_SPHERE: +2.00
OD_CYLINDER: +1.00

## 2022-03-30 ASSESSMENT — VISUAL ACUITY
METHOD: SNELLEN - LINEAR
OS_CC: 20/25
OD_CC: 20/20
OS_CC+: -1
OD_CC+: -2
CORRECTION_TYPE: GLASSES

## 2022-03-30 ASSESSMENT — CONF VISUAL FIELD
OD_NORMAL: 1
METHOD: COUNTING FINGERS
OS_NORMAL: 1

## 2022-03-30 ASSESSMENT — SLIT LAMP EXAM - LIDS
COMMENTS: 2+, DERMATOCHALASIS
COMMENTS: 2+, DERMATOCHALASIS

## 2022-03-30 ASSESSMENT — REFRACTION_MANIFEST
OD_SPHERE: +3.00
OS_ADD: +2.75
OD_ADD: +2.75
OS_SPHERE: +3.50
OD_AXIS: 035
OD_CYLINDER: +0.75
OS_CYLINDER: SPHERE

## 2022-03-30 ASSESSMENT — EXTERNAL EXAM - RIGHT EYE: OD_EXAM: 2+ BROW PTOSIS, PROLAPSED FAT PADS: LOWER

## 2022-03-30 ASSESSMENT — CUP TO DISC RATIO
OD_RATIO: 0.0
OS_RATIO: 0.1

## 2022-03-30 ASSESSMENT — EXTERNAL EXAM - LEFT EYE: OS_EXAM: 2+ BROW PTOSIS, PROLAPSED FAT PADS: LOWER

## 2022-03-30 NOTE — PATIENT INSTRUCTIONS
"Glasses prescription given - optional  Use artificial tears up to four times a day (like Refresh Optive, Systane Balance, TheraTears, or generic artificial tears are ok. Avoid \"get the red out\" drops).   Possible posterior vitreous detachment (sudden onset large floater and/or flashing lights) both eyes discussed.  Will obtain an OCT today - I will call if any concerns    Call in November 2022 for an appointment in March 2023 for Complete Exam    Dr. Drummond [phone]    "

## 2022-03-30 NOTE — PROGRESS NOTES
" Current Eye Medications:  None     Subjective:  Cataract eval. Vision is doing well right eye. Vision is a little cloudy left eye for last year. No eye pain or discomfort in either eye.      Objective:  See Ophthalmology Exam.       Assessment:  Stable mild cataract both eyes.  Epiretinal membranes now moderate both eyes.      ICD-10-CM    1. Combined forms of age-related cataract, mild, of both eyes  H25.813    2. Epiretinal membrane, mod, of both eyes  H35.373    3. Presbyopia  H52.4         Plan:  Glasses prescription given - optional  Use artificial tears up to four times a day (like Refresh Optive, Systane Balance, TheraTears, or generic artificial tears are ok. Avoid \"get the red out\" drops).   Possible posterior vitreous detachment (sudden onset large floater and/or flashing lights) both eyes discussed.  Will obtain an OCT today - I will call if any concerns    Call in November 2022 for an appointment in March 2023 for Complete Exam    Dr. Drummond [phone]           "

## 2022-03-30 NOTE — LETTER
"    3/30/2022         RE: Dejan Hammer  [address]  [place] [address]        Dear Colleague,    Thank you for referring your patient, Dejan Hammer, to the [facility]. Please see a copy of my visit note below.     Current Eye Medications:  None     Subjective:  Cataract eval. Vision is doing well right eye. Vision is a little cloudy left eye for last year. No eye pain or discomfort in either eye.      Objective:  See Ophthalmology Exam.       Assessment:  Stable mild cataract both eyes.  Epiretinal membranes now moderate both eyes.      ICD-10-CM    1. Combined forms of age-related cataract, mild, of both eyes  H25.813    2. Epiretinal membrane, mod, of both eyes  H35.373    3. Presbyopia  H52.4         Plan:  Glasses prescription given - optional  Use artificial tears up to four times a day (like Refresh Optive, Systane Balance, TheraTears, or generic artificial tears are ok. Avoid \"get the red out\" drops).   Possible posterior vitreous detachment (sudden onset large floater and/or flashing lights) both eyes discussed.  Will obtain an OCT today - I will call if any concerns    Call in November 2022 for an appointment in March 2023 for Complete Exam    Dr. Drummond [phone]               Again, thank you for allowing me to participate in the care of your patient.        Sincerely,        Anson Drummond MD    "

## 2022-04-17 ENCOUNTER — HEALTH MAINTENANCE LETTER (OUTPATIENT)
Age: 77
End: 2022-04-17

## 2022-05-13 ENCOUNTER — TRANSFERRED RECORDS (OUTPATIENT)
Dept: HEALTH INFORMATION MANAGEMENT | Facility: CLINIC | Age: 77
End: 2022-05-13
Payer: COMMERCIAL

## 2022-05-23 ASSESSMENT — ENCOUNTER SYMPTOMS
NAUSEA: 0
JOINT SWELLING: 0
CONSTIPATION: 0
DYSURIA: 0
NERVOUS/ANXIOUS: 0
HEARTBURN: 0
FEVER: 0
EYE PAIN: 0
CHILLS: 0
WEAKNESS: 0
HEMATOCHEZIA: 0
DIZZINESS: 0
SORE THROAT: 0
DIARRHEA: 0
ABDOMINAL PAIN: 0
HEADACHES: 0
SHORTNESS OF BREATH: 0
HEMATURIA: 0
ARTHRALGIAS: 0
PALPITATIONS: 0
COUGH: 0
FREQUENCY: 0
MYALGIAS: 0
PARESTHESIAS: 0

## 2022-05-23 ASSESSMENT — ACTIVITIES OF DAILY LIVING (ADL): CURRENT_FUNCTION: NO ASSISTANCE NEEDED

## 2022-05-24 ENCOUNTER — OFFICE VISIT (OUTPATIENT)
Dept: FAMILY MEDICINE | Facility: CLINIC | Age: 77
End: 2022-05-24
Payer: COMMERCIAL

## 2022-05-24 VITALS
BODY MASS INDEX: 29.37 KG/M2 | SYSTOLIC BLOOD PRESSURE: 124 MMHG | DIASTOLIC BLOOD PRESSURE: 82 MMHG | WEIGHT: 209.8 LBS | OXYGEN SATURATION: 98 % | HEART RATE: 79 BPM | HEIGHT: 71 IN | RESPIRATION RATE: 17 BRPM

## 2022-05-24 DIAGNOSIS — Z12.5 SCREENING FOR PROSTATE CANCER: ICD-10-CM

## 2022-05-24 DIAGNOSIS — Z00.00 ENCOUNTER FOR WELLNESS EXAMINATION: Primary | ICD-10-CM

## 2022-05-24 LAB
ANION GAP SERPL CALCULATED.3IONS-SCNC: 5 MMOL/L (ref 3–14)
BUN SERPL-MCNC: 27 MG/DL (ref 7–30)
CALCIUM SERPL-MCNC: 9.3 MG/DL (ref 8.5–10.1)
CHLORIDE BLD-SCNC: 106 MMOL/L (ref 94–109)
CHOLEST SERPL-MCNC: 157 MG/DL
CO2 SERPL-SCNC: 27 MMOL/L (ref 20–32)
CREAT SERPL-MCNC: 1.32 MG/DL (ref 0.66–1.25)
FASTING STATUS PATIENT QL REPORTED: YES
GFR SERPL CREATININE-BSD FRML MDRD: 56 ML/MIN/1.73M2
GLUCOSE BLD-MCNC: 107 MG/DL (ref 70–99)
HDLC SERPL-MCNC: 66 MG/DL
LDLC SERPL CALC-MCNC: 83 MG/DL
NONHDLC SERPL-MCNC: 91 MG/DL
POTASSIUM BLD-SCNC: 5.1 MMOL/L (ref 3.4–5.3)
PSA SERPL-MCNC: 3.56 UG/L (ref 0–4)
SODIUM SERPL-SCNC: 138 MMOL/L (ref 133–144)
TRIGL SERPL-MCNC: 42 MG/DL

## 2022-05-24 PROCEDURE — 80048 BASIC METABOLIC PNL TOTAL CA: CPT | Performed by: FAMILY MEDICINE

## 2022-05-24 PROCEDURE — 80061 LIPID PANEL: CPT | Performed by: FAMILY MEDICINE

## 2022-05-24 PROCEDURE — 36415 COLL VENOUS BLD VENIPUNCTURE: CPT | Performed by: FAMILY MEDICINE

## 2022-05-24 PROCEDURE — 99397 PER PM REEVAL EST PAT 65+ YR: CPT | Performed by: FAMILY MEDICINE

## 2022-05-24 PROCEDURE — G0103 PSA SCREENING: HCPCS | Performed by: FAMILY MEDICINE

## 2022-05-24 ASSESSMENT — ENCOUNTER SYMPTOMS
PALPITATIONS: 0
COUGH: 0
HEARTBURN: 0
NERVOUS/ANXIOUS: 0
JOINT SWELLING: 0
CONSTIPATION: 0
DYSURIA: 0
EYE PAIN: 0
ARTHRALGIAS: 0
CHILLS: 0
MYALGIAS: 0
WEAKNESS: 0
HEMATOCHEZIA: 0
ABDOMINAL PAIN: 0
NAUSEA: 0
FREQUENCY: 0
SHORTNESS OF BREATH: 0
SORE THROAT: 0
DIARRHEA: 0
HEADACHES: 0
FEVER: 0
HEMATURIA: 0
DIZZINESS: 0
PARESTHESIAS: 0

## 2022-05-24 ASSESSMENT — PAIN SCALES - GENERAL: PAINLEVEL: NO PAIN (0)

## 2022-05-24 ASSESSMENT — ACTIVITIES OF DAILY LIVING (ADL): CURRENT_FUNCTION: NO ASSISTANCE NEEDED

## 2022-05-24 NOTE — PROGRESS NOTES
"SUBJECTIVE:   CC: Dejan Hammer is an 77 year old male who presents for preventative health visit.   Healthy Habits:     In general, how would you rate your overall health?  Excellent    Frequency of exercise:  1 day/week    Duration of exercise:  30-45 minutes    Do you usually eat at least 4 servings of fruit and vegetables a day, include whole grains    & fiber and avoid regularly eating high fat or \"junk\" foods?  No    Taking medications regularly:  Not Applicable    Medication side effects:  Not applicable    Ability to successfully perform activities of daily living:  No assistance needed    Home Safety:  No safety concerns identified    Hearing Impairment:  Difficulty following a conversation in a noisy restaurant or crowded room, feel that people are mumbling or not speaking clearly, difficult to understand a speaker at a public meeting or Congregation service, need to ask people to speak up or repeat themselves, find that men's voices are easier to understand than woman's and difficulty understanding soft or whispered speech    In the past 6 months, have you been bothered by leaking of urine? Yes    In general, how would you rate your overall mental or emotional health?  Excellent      PHQ-2 Total Score: 0    Additional concerns today:  Yes    {additional problems to add (Optional):452489}    Today's PHQ-2 Score:   PHQ-2 ( 1999 [employer]) 5/23/2022   Q1: Little interest or pleasure in doing things 0   Q2: Feeling down, depressed or hopeless 0   PHQ-2 Score 0   PHQ-2 Total Score (12-17 Years)- Positive if 3 or more points; Administer PHQ-A if positive -   Q1: Little interest or pleasure in doing things Not at all   Q2: Feeling down, depressed or hopeless Not at all   PHQ-2 Score 0     Abuse: Current or Past(Physical, Sexual or Emotional)- { :992429}  Do you feel safe in your environment? { :890185}    Social History     Tobacco Use     Smoking status: Never Smoker     Smokeless tobacco: Never Used     Tobacco " "comment: smoke free household.   Substance Use Topics     Alcohol use: No     If you drink alcohol do you typically have >3 drinks per day or >7 drinks per week? No    Alcohol Use 5/23/2022   Prescreen: >3 drinks/day or >7 drinks/week? Not Applicable   Prescreen: >3 drinks/day or >7 drinks/week? -   No flowsheet data found.    Last PSA:   PSA   Date Value Ref Range Status   03/29/2021 3.33 0 - 4 ug/L Final     Comment:     Assay Method:  Chemiluminescence using Siemens Vista analyzer       Reviewed orders with patient. Reviewed health maintenance and updated orders accordingly - Yes  {Chronicprobdata (optional):123262}    Reviewed and updated as needed this visit by clinical staff                    Reviewed and updated as needed this visit by Provider                   {HISTORY OPTIONS (Optional):148334}    Review of Systems   Constitutional: Negative for chills and fever.   HENT: Negative for congestion, ear pain, hearing loss and sore throat.    Eyes: Negative for pain and visual disturbance.   Respiratory: Negative for cough and shortness of breath.    Cardiovascular: Positive for peripheral edema. Negative for chest pain and palpitations.   Gastrointestinal: Negative for abdominal pain, constipation, diarrhea, heartburn, hematochezia and nausea.   Genitourinary: Negative for dysuria, frequency, genital sores, hematuria, impotence, penile discharge and urgency.   Musculoskeletal: Negative for arthralgias, joint swelling and myalgias.   Skin: Negative for rash.   Neurological: Negative for dizziness, weakness, headaches and paresthesias.   Psychiatric/Behavioral: Negative for mood changes. The patient is not nervous/anxious.      {MALE ROS (Optional):825059::\"CONSTITUTIONAL: NEGATIVE for fever, chills, change in weight\",\"INTEGUMENTARY/SKIN: NEGATIVE for worrisome rashes, moles or lesions\",\"EYES: NEGATIVE for vision changes or irritation\",\"ENT: NEGATIVE for ear, mouth and throat problems\",\"RESP: NEGATIVE for " "significant cough or SOB\",\"CV: NEGATIVE for chest pain, palpitations or peripheral edema\",\"GI: NEGATIVE for nausea, abdominal pain, heartburn, or change in bowel habits\",\" male: negative for dysuria, hematuria, decreased urinary stream, erectile dysfunction, urethral discharge\",\"MUSCULOSKELETAL: NEGATIVE for significant arthralgias or myalgia\",\"NEURO: NEGATIVE for weakness, dizziness or paresthesias\",\"PSYCHIATRIC: NEGATIVE for changes in mood or affect\"}    OBJECTIVE:   There were no vitals taken for this visit.    Physical Exam  {Exam Choices (Optional):155517}    {Diagnostic Test Results (Optional):726183::\"Diagnostic Test Results:\",\"Labs reviewed in Epic\"}    ASSESSMENT/PLAN:   {Diag Picklist:964151}    {Patient advised of split billing (Optional):663188}    COUNSELING:   {MALE COUNSELING MESSAGES:141523::\"Reviewed preventive health counseling, as reflected in patient instructions\"}    Estimated body mass index is 29.28 kg/m  as calculated from the following:    Height as of 9/15/21: 1.782 m (5' 10.16\").    Weight as of 9/15/21: 93 kg (205 lb).     {Weight Management Plan (ACO) Complete if BMI is abnormal-  Ages 18-64  BMI >24.9.  Age 65+ with BMI <23 or >30 (Optional):027459}    He reports that he has never smoked. He has never used smokeless tobacco.      Counseling Resources:  ATP IV Guidelines  Pooled Cohorts Equation Calculator  FRAX Risk Assessment  ICSI Preventive Guidelines  Dietary Guidelines for Americans, 2010  USDA's MyPlate  ASA Prophylaxis  Lung CA Screening    Jonas Toney MD  [facility]  "
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
## 2022-08-10 ENCOUNTER — TRANSFERRED RECORDS (OUTPATIENT)
Dept: HEALTH INFORMATION MANAGEMENT | Facility: CLINIC | Age: 77
End: 2022-08-10

## 2022-10-23 ENCOUNTER — HEALTH MAINTENANCE LETTER (OUTPATIENT)
Age: 77
End: 2022-10-23

## 2023-04-03 ENCOUNTER — OFFICE VISIT (OUTPATIENT)
Dept: OPHTHALMOLOGY | Facility: CLINIC | Age: 78
End: 2023-04-03
Payer: COMMERCIAL

## 2023-04-03 DIAGNOSIS — H35.373 EPIRETINAL MEMBRANE (ERM) OF BOTH EYES: ICD-10-CM

## 2023-04-03 DIAGNOSIS — H25.813 COMBINED FORMS OF AGE-RELATED CATARACT OF BOTH EYES: Primary | ICD-10-CM

## 2023-04-03 DIAGNOSIS — H52.4 PRESBYOPIA: ICD-10-CM

## 2023-04-03 DIAGNOSIS — Z01.01 ENCOUNTER FOR EXAMINATION OF EYES AND VISION WITH ABNORMAL FINDINGS: ICD-10-CM

## 2023-04-03 PROCEDURE — 92015 DETERMINE REFRACTIVE STATE: CPT | Performed by: OPHTHALMOLOGY

## 2023-04-03 PROCEDURE — 92014 COMPRE OPH EXAM EST PT 1/>: CPT | Performed by: OPHTHALMOLOGY

## 2023-04-03 ASSESSMENT — TONOMETRY
OS_IOP_MMHG: 16
OD_IOP_MMHG: 15
IOP_METHOD: APPLANATION

## 2023-04-03 ASSESSMENT — VISUAL ACUITY
METHOD: SNELLEN - LINEAR
OS_CC+: -1
OD_CC: 20/30
OS_CC: 20/40
CORRECTION_TYPE: GLASSES
OD_CC+: -2

## 2023-04-03 ASSESSMENT — EXTERNAL EXAM - RIGHT EYE: OD_EXAM: 2+ BROW PTOSIS, PROLAPSED FAT PADS: LOWER

## 2023-04-03 ASSESSMENT — SLIT LAMP EXAM - LIDS
COMMENTS: 2-3+ DERMATOCHALASIS
COMMENTS: 2-3+ DERMATOCHALASIS

## 2023-04-03 ASSESSMENT — EXTERNAL EXAM - LEFT EYE: OS_EXAM: 2+ BROW PTOSIS, PROLAPSED FAT PADS: LOWER

## 2023-04-03 ASSESSMENT — CONF VISUAL FIELD
OS_NORMAL: 1
OS_INFERIOR_NASAL_RESTRICTION: 0
OD_SUPERIOR_TEMPORAL_RESTRICTION: 0
OS_SUPERIOR_TEMPORAL_RESTRICTION: 0
OD_INFERIOR_TEMPORAL_RESTRICTION: 0
OD_INFERIOR_NASAL_RESTRICTION: 0
OD_SUPERIOR_NASAL_RESTRICTION: 0
OS_SUPERIOR_NASAL_RESTRICTION: 0
OD_NORMAL: 1
OS_INFERIOR_TEMPORAL_RESTRICTION: 0

## 2023-04-03 ASSESSMENT — CUP TO DISC RATIO
OD_RATIO: 0.0
OS_RATIO: 0.1

## 2023-04-03 ASSESSMENT — REFRACTION_MANIFEST
OD_CYLINDER: +0.50
OS_ADD: +2.75
OD_ADD: +2.75
OD_SPHERE: +3.25
OS_SPHERE: +3.75
OD_AXIS: 005
OS_CYLINDER: SPHERE

## 2023-04-03 ASSESSMENT — REFRACTION_WEARINGRX
OD_AXIS: 035
OS_CYLINDER: SPHERE
OD_ADD: +2.75
SPECS_TYPE: PAL
OS_ADD: +2.75
OD_CYLINDER: +0.75
OS_SPHERE: +3.50
OD_SPHERE: +2.75

## 2023-04-03 NOTE — PROGRESS NOTES
" Current Eye Medications:  None     Subjective: Here for complete eye exam. Vision is stable at distance and near in both eyes. Vision in left eye continues to be a little fuzzy.   No eye pain or discomfort.      Objective:  See Ophthalmology Exam.       Assessment:  Stable eye exam.      ICD-10-CM    1. Combined forms of age-related cataract, mild, of both eyes  H25.813       2. Epiretinal membrane, mod, of both eyes  H35.373       3. Encounter for examination of eyes and vision with abnormal findings  Z01.01       4. Presbyopia  H52.4            Plan:  Glasses prescription given - optional  May use artificial tears up to four times a day (like Refresh Optive, Systane Balance, TheraTears, or generic artificial tears are ok. Avoid \"get the red out\" drops).  Possible posterior vitreous detachment (sudden onset large floater and/or flashing lights) both eyes discussed.   Call in November 2023 for an appointment in March 2024 for Complete Exam    Dr. Drummond [phone]       "

## 2023-04-03 NOTE — LETTER
"    4/3/2023         RE: Dejan Hammer  [address] [place] [address]        Dear Colleague,    Thank you for referring your patient, Dejan Hammer, to the [facility]. Please see a copy of my visit note below.     Current Eye Medications:  None     Subjective: Here for complete eye exam. Vision is stable at distance and near in both eyes. Vision in left eye continues to be a little fuzzy.   No eye pain or discomfort.      Objective:  See Ophthalmology Exam.       Assessment:  Stable eye exam.      ICD-10-CM    1. Combined forms of age-related cataract, mild, of both eyes  H25.813       2. Epiretinal membrane, mod, of both eyes  H35.373       3. Encounter for examination of eyes and vision with abnormal findings  Z01.01       4. Presbyopia  H52.4            Plan:  Glasses prescription given - optional  May use artificial tears up to four times a day (like Refresh Optive, Systane Balance, TheraTears, or generic artificial tears are ok. Avoid \"get the red out\" drops).  Possible posterior vitreous detachment (sudden onset large floater and/or flashing lights) both eyes discussed.   Call in November 2023 for an appointment in March 2024 for Complete Exam    Dr. Drummond [phone]           Again, thank you for allowing me to participate in the care of your patient.        Sincerely,        Anson Drummond MD    "

## 2023-04-03 NOTE — PATIENT INSTRUCTIONS
"Glasses prescription given - optional  May use artificial tears up to four times a day (like Refresh Optive, Systane Balance, TheraTears, or generic artificial tears are ok. Avoid \"get the red out\" drops).  Possible posterior vitreous detachment (sudden onset large floater and/or flashing lights) both eyes discussed.   Call in November 2023 for an appointment in March 2024 for Complete Exam    Dr. Drummond [phone]    "

## 2023-04-06 ENCOUNTER — TRANSFERRED RECORDS (OUTPATIENT)
Dept: HEALTH INFORMATION MANAGEMENT | Facility: CLINIC | Age: 78
End: 2023-04-06
Payer: COMMERCIAL

## 2023-05-23 ENCOUNTER — TRANSFERRED RECORDS (OUTPATIENT)
Dept: FAMILY MEDICINE | Facility: CLINIC | Age: 78
End: 2023-05-23
Payer: COMMERCIAL

## 2023-06-05 ENCOUNTER — OFFICE VISIT (OUTPATIENT)
Dept: FAMILY MEDICINE | Facility: CLINIC | Age: 78
End: 2023-06-05
Payer: COMMERCIAL

## 2023-06-05 VITALS
TEMPERATURE: 97.8 F | HEIGHT: 70 IN | SYSTOLIC BLOOD PRESSURE: 131 MMHG | BODY MASS INDEX: 29.23 KG/M2 | OXYGEN SATURATION: 96 % | DIASTOLIC BLOOD PRESSURE: 82 MMHG | WEIGHT: 204.2 LBS | HEART RATE: 68 BPM

## 2023-06-05 DIAGNOSIS — Z12.5 SPECIAL SCREENING FOR MALIGNANT NEOPLASM OF PROSTATE: ICD-10-CM

## 2023-06-05 DIAGNOSIS — N40.1 BENIGN PROSTATIC HYPERPLASIA WITH INCOMPLETE BLADDER EMPTYING: Primary | ICD-10-CM

## 2023-06-05 DIAGNOSIS — Z23 NEED FOR SHINGLES VACCINE: ICD-10-CM

## 2023-06-05 DIAGNOSIS — R39.14 BENIGN PROSTATIC HYPERPLASIA WITH INCOMPLETE BLADDER EMPTYING: Primary | ICD-10-CM

## 2023-06-05 DIAGNOSIS — Z13.6 CARDIOVASCULAR SCREENING; LDL GOAL LESS THAN 160: ICD-10-CM

## 2023-06-05 LAB
ALBUMIN SERPL BCG-MCNC: 4 G/DL (ref 3.5–5.2)
ALP SERPL-CCNC: 67 U/L (ref 40–129)
ALT SERPL W P-5'-P-CCNC: 19 U/L (ref 10–50)
ANION GAP SERPL CALCULATED.3IONS-SCNC: 8 MMOL/L (ref 7–15)
AST SERPL W P-5'-P-CCNC: 25 U/L (ref 10–50)
BILIRUB SERPL-MCNC: 0.5 MG/DL
BUN SERPL-MCNC: 26.5 MG/DL (ref 8–23)
CALCIUM SERPL-MCNC: 9.3 MG/DL (ref 8.8–10.2)
CHLORIDE SERPL-SCNC: 105 MMOL/L (ref 98–107)
CHOLEST SERPL-MCNC: 153 MG/DL
CREAT SERPL-MCNC: 1.24 MG/DL (ref 0.67–1.17)
DEPRECATED HCO3 PLAS-SCNC: 27 MMOL/L (ref 22–29)
GFR SERPL CREATININE-BSD FRML MDRD: 60 ML/MIN/1.73M2
GLUCOSE SERPL-MCNC: 107 MG/DL (ref 70–99)
HDLC SERPL-MCNC: 53 MG/DL
LDLC SERPL CALC-MCNC: 91 MG/DL
NONHDLC SERPL-MCNC: 100 MG/DL
POTASSIUM SERPL-SCNC: 4.5 MMOL/L (ref 3.4–5.3)
PROT SERPL-MCNC: 6.9 G/DL (ref 6.4–8.3)
PSA SERPL DL<=0.01 NG/ML-MCNC: 3.51 NG/ML (ref 0–6.5)
SODIUM SERPL-SCNC: 140 MMOL/L (ref 136–145)
TRIGL SERPL-MCNC: 44 MG/DL

## 2023-06-05 PROCEDURE — 80061 LIPID PANEL: CPT | Performed by: INTERNAL MEDICINE

## 2023-06-05 PROCEDURE — 80053 COMPREHEN METABOLIC PANEL: CPT | Performed by: INTERNAL MEDICINE

## 2023-06-05 PROCEDURE — G0439 PPPS, SUBSEQ VISIT: HCPCS | Performed by: INTERNAL MEDICINE

## 2023-06-05 PROCEDURE — G0103 PSA SCREENING: HCPCS | Performed by: INTERNAL MEDICINE

## 2023-06-05 PROCEDURE — 36415 COLL VENOUS BLD VENIPUNCTURE: CPT | Performed by: INTERNAL MEDICINE

## 2023-06-05 RX ORDER — TAMSULOSIN HYDROCHLORIDE 0.4 MG/1
0.4 CAPSULE ORAL DAILY
Qty: 90 CAPSULE | Refills: 4 | Status: SHIPPED | OUTPATIENT
Start: 2023-06-05 | End: 2023-09-03

## 2023-06-05 ASSESSMENT — ENCOUNTER SYMPTOMS
NERVOUS/ANXIOUS: 0
HEARTBURN: 0
DYSURIA: 0
JOINT SWELLING: 0
SORE THROAT: 0
HEADACHES: 0
MYALGIAS: 0
PARESTHESIAS: 0
WEAKNESS: 0
FREQUENCY: 1
ABDOMINAL PAIN: 0
DIZZINESS: 0
NAUSEA: 0
HEMATURIA: 0
EYE PAIN: 0
DIARRHEA: 0
PALPITATIONS: 0
HEMATOCHEZIA: 0
CONSTIPATION: 0
CHILLS: 0
ARTHRALGIAS: 0
COUGH: 0
FEVER: 0
SHORTNESS OF BREATH: 0

## 2023-06-05 ASSESSMENT — PAIN SCALES - GENERAL: PAINLEVEL: NO PAIN (0)

## 2023-06-05 ASSESSMENT — ACTIVITIES OF DAILY LIVING (ADL): CURRENT_FUNCTION: NO ASSISTANCE NEEDED
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
## 2023-06-26 ENCOUNTER — TRANSFERRED RECORDS (OUTPATIENT)
Dept: HEALTH INFORMATION MANAGEMENT | Facility: CLINIC | Age: 78
End: 2023-06-26
Payer: COMMERCIAL

## 2023-10-02 ENCOUNTER — OFFICE VISIT (OUTPATIENT)
Dept: OPHTHALMOLOGY | Facility: CLINIC | Age: 78
End: 2023-10-02
Payer: COMMERCIAL

## 2023-10-02 DIAGNOSIS — H35.373 EPIRETINAL MEMBRANE (ERM) OF BOTH EYES: Primary | ICD-10-CM

## 2023-10-02 DIAGNOSIS — H25.813 COMBINED FORMS OF AGE-RELATED CATARACT OF BOTH EYES: ICD-10-CM

## 2023-10-02 PROCEDURE — 92134 CPTRZ OPH DX IMG PST SGM RTA: CPT | Performed by: OPHTHALMOLOGY

## 2023-10-02 PROCEDURE — 92012 INTRM OPH EXAM EST PATIENT: CPT | Performed by: OPHTHALMOLOGY

## 2023-10-02 ASSESSMENT — CONF VISUAL FIELD
OD_SUPERIOR_TEMPORAL_RESTRICTION: 0
OS_INFERIOR_NASAL_RESTRICTION: 0
OD_INFERIOR_TEMPORAL_RESTRICTION: 0
OS_SUPERIOR_TEMPORAL_RESTRICTION: 0
OD_NORMAL: 1
OS_SUPERIOR_NASAL_RESTRICTION: 0
OS_NORMAL: 1
OD_INFERIOR_NASAL_RESTRICTION: 0
OS_INFERIOR_TEMPORAL_RESTRICTION: 0
OD_SUPERIOR_NASAL_RESTRICTION: 0

## 2023-10-02 ASSESSMENT — VISUAL ACUITY
OD_CC: 20/30
OD_CC+: -2
OS_CC: 20/40
METHOD: SNELLEN - LINEAR
OS_CC+: -2
CORRECTION_TYPE: GLASSES

## 2023-10-02 ASSESSMENT — REFRACTION_WEARINGRX
SPECS_TYPE: PAL
OS_CYLINDER: SPHERE
OS_SPHERE: +3.75
OD_SPHERE: +3.00
OD_ADD: +2.75
OD_AXIS: 037
OD_CYLINDER: +0.75
OS_ADD: +2.75

## 2023-10-02 ASSESSMENT — REFRACTION_MANIFEST
OD_AXIS: 015
OS_AXIS: 030
OS_ADD: +2.75
OD_CYLINDER: +0.75
OD_SPHERE: +3.50
OD_ADD: +2.75
OS_CYLINDER: +0.50
OS_SPHERE: +3.75

## 2023-10-02 ASSESSMENT — CUP TO DISC RATIO
OD_RATIO: 0.0
OS_RATIO: 0.1

## 2023-10-02 ASSESSMENT — TONOMETRY
IOP_METHOD: APPLANATION
OD_IOP_MMHG: 17
OS_IOP_MMHG: 16

## 2023-10-02 ASSESSMENT — SLIT LAMP EXAM - LIDS
COMMENTS: 2-3+ DERMATOCHALASIS
COMMENTS: 2-3+ DERMATOCHALASIS

## 2023-10-02 ASSESSMENT — EXTERNAL EXAM - LEFT EYE: OS_EXAM: 2+ BROW PTOSIS, PROLAPSED FAT PADS: LOWER

## 2023-10-02 ASSESSMENT — EXTERNAL EXAM - RIGHT EYE: OD_EXAM: 2+ BROW PTOSIS, PROLAPSED FAT PADS: LOWER

## 2023-10-02 NOTE — LETTER
"    10/2/2023         RE: Dejan Hammer  [address]  [place] [address]        Dear Colleague,    Thank you for referring your patient, Dejan Hammer, to the [facility]. Please see a copy of my visit note below.     Current Eye Medications:  none  OTC vision vitamin - orally every day (unsure reason or type - from wife).     Subjective:  Blurred vision L>R. Gradually worsening since last CE 4/2023. Having a hard time reading with glasses on - wondering if seg height too low. Also struggling to see street signs. Denies any waviness or distortion either eye.    Glasses 6 mos old from [employer].      Objective:  See Ophthalmology Exam.       Assessment:  Mild worsening of epiretinal membranes.  Stable mild cataract both eyes.      Plan:  May use artificial tears up to four times a day (like Refresh Optive, Systane Balance, TheraTears, or generic artificial tears are ok. Avoid \"get the red out\" drops).     Continue observation of epiretinal membranes; retina opinion anytime.    Possible posterior vitreous detachment (sudden onset large floater and/or flashing lights) right eye discussed.     Return visit in April 2024 for a complete exam     Dr. Drummond [phone]          Again, thank you for allowing me to participate in the care of your patient.        Sincerely,        Anson Drummond MD  "

## 2023-10-02 NOTE — PROGRESS NOTES
" Current Eye Medications:  none  OTC vision vitamin - orally every day (unsure reason or type - from wife).     Subjective:  Blurred vision L>R. Gradually worsening since last CE 4/2023. Having a hard time reading with glasses on - wondering if seg height too low. Also struggling to see street signs. Denies any waviness or distortion either eye.    Glasses 6 mos old from [employer].      Objective:  See Ophthalmology Exam.       Assessment:  Mild worsening of epiretinal membranes.  Stable mild cataract both eyes.      Plan:  May use artificial tears up to four times a day (like Refresh Optive, Systane Balance, TheraTears, or generic artificial tears are ok. Avoid \"get the red out\" drops).     Continue observation of epiretinal membranes; retina opinion anytime.    Possible posterior vitreous detachment (sudden onset large floater and/or flashing lights) right eye discussed.     Return visit in April 2024 for a complete exam     Dr. Drummond [phone]        "

## 2023-10-02 NOTE — PATIENT INSTRUCTIONS
"May use artificial tears up to four times a day (like Refresh Optive, Systane Balance, TheraTears, or generic artificial tears are ok. Avoid \"get the red out\" drops).     Continue observation of epiretinal membranes     Possible posterior vitreous detachment (sudden onset large floater and/or flashing lights) right eye discussed.     Return visit in April 2024 for a complete exam     Dr. Drummond [phone]    "

## 2023-10-27 ENCOUNTER — TRANSFERRED RECORDS (OUTPATIENT)
Dept: HEALTH INFORMATION MANAGEMENT | Facility: CLINIC | Age: 78
End: 2023-10-27
Payer: COMMERCIAL

## 2023-11-12 ENCOUNTER — TRANSFERRED RECORDS (OUTPATIENT)
Dept: HEALTH INFORMATION MANAGEMENT | Facility: CLINIC | Age: 78
End: 2023-11-12
Payer: COMMERCIAL

## 2024-02-19 ENCOUNTER — TRANSFERRED RECORDS (OUTPATIENT)
Dept: HEALTH INFORMATION MANAGEMENT | Facility: CLINIC | Age: 79
End: 2024-02-19
Payer: COMMERCIAL

## 2024-05-06 ENCOUNTER — OFFICE VISIT (OUTPATIENT)
Dept: OPHTHALMOLOGY | Facility: CLINIC | Age: 79
End: 2024-05-06
Payer: COMMERCIAL

## 2024-05-06 ENCOUNTER — PATIENT OUTREACH (OUTPATIENT)
Dept: CARE COORDINATION | Facility: CLINIC | Age: 79
End: 2024-05-06

## 2024-05-06 DIAGNOSIS — H25.813 COMBINED FORMS OF AGE-RELATED CATARACT OF BOTH EYES: Primary | ICD-10-CM

## 2024-05-06 DIAGNOSIS — Z01.01 ENCOUNTER FOR EXAMINATION OF EYES AND VISION WITH ABNORMAL FINDINGS: ICD-10-CM

## 2024-05-06 DIAGNOSIS — H35.373 EPIRETINAL MEMBRANE (ERM) OF BOTH EYES: ICD-10-CM

## 2024-05-06 DIAGNOSIS — H52.4 PRESBYOPIA: ICD-10-CM

## 2024-05-06 PROCEDURE — 92015 DETERMINE REFRACTIVE STATE: CPT | Performed by: OPHTHALMOLOGY

## 2024-05-06 PROCEDURE — 92014 COMPRE OPH EXAM EST PT 1/>: CPT | Performed by: OPHTHALMOLOGY

## 2024-05-06 ASSESSMENT — REFRACTION_WEARINGRX
OS_SPHERE: +3.75
OD_SPHERE: +3.00
OD_CYLINDER: +0.75
OS_CYLINDER: SPHERE
OS_ADD: +2.75
OD_AXIS: 037
OD_ADD: +2.75
SPECS_TYPE: PAL

## 2024-05-06 ASSESSMENT — CONF VISUAL FIELD
OS_INFERIOR_NASAL_RESTRICTION: 0
OD_SUPERIOR_TEMPORAL_RESTRICTION: 0
OS_SUPERIOR_NASAL_RESTRICTION: 0
OD_INFERIOR_TEMPORAL_RESTRICTION: 0
OD_NORMAL: 1
OD_SUPERIOR_NASAL_RESTRICTION: 0
OS_SUPERIOR_TEMPORAL_RESTRICTION: 0
OS_NORMAL: 1
OD_INFERIOR_NASAL_RESTRICTION: 0
OS_INFERIOR_TEMPORAL_RESTRICTION: 0

## 2024-05-06 ASSESSMENT — REFRACTION_MANIFEST
OD_ADD: +3.00
OS_ADD: +3.00
OS_SPHERE: +3.50
OD_CYLINDER: +0.50
OS_CYLINDER: SPHERE
OD_AXIS: 005
OD_SPHERE: +3.25

## 2024-05-06 ASSESSMENT — VISUAL ACUITY
OD_CC+: -1
OS_CC+: -1
METHOD: SNELLEN - LINEAR
OD_CC: 20/40
OS_CC: 20/30
CORRECTION_TYPE: GLASSES

## 2024-05-06 ASSESSMENT — CUP TO DISC RATIO
OS_RATIO: 0.1
OD_RATIO: 0.0

## 2024-05-06 ASSESSMENT — TONOMETRY
OD_IOP_MMHG: 15
IOP_METHOD: APPLANATION
OS_IOP_MMHG: 15

## 2024-05-06 ASSESSMENT — EXTERNAL EXAM - RIGHT EYE: OD_EXAM: 2+ BROW PTOSIS, PROLAPSED FAT PADS: LOWER

## 2024-05-06 ASSESSMENT — SLIT LAMP EXAM - LIDS
COMMENTS: 2-3+ DERMATOCHALASIS
COMMENTS: 2-3+ DERMATOCHALASIS

## 2024-05-06 ASSESSMENT — EXTERNAL EXAM - LEFT EYE: OS_EXAM: 2+ BROW PTOSIS, PROLAPSED FAT PADS: LOWER

## 2024-05-06 NOTE — PROGRESS NOTES
" Current Eye Medications: None     Subjective: Here for complete eye exam. Patient complains of fine print being blurry sometimes. Pushing glasses up slightly helps. Distance vision is stable. No eye pain or discomfort.      Objective:  See Ophthalmology Exam.       Assessment:  Stable eye exam.      ICD-10-CM    1. Combined forms of age-related cataract, mild, of both eyes  H25.813       2. Epiretinal membrane, mod, of both eyes  H35.373       3. Encounter for examination of eyes and vision with abnormal findings  Z01.01       4. Presbyopia  H52.4            Plan:  Glasses prescription given - optional  May use artificial tears up to four times a day (like Refresh Optive, Systane Balance, TheraTears, or generic artificial tears are ok. Avoid \"get the red out\" drops).   Possible posterior vitreous detachment (sudden onset large floater and/or flashing lights) both eyes discussed.   Call in January 2025 for an appointment in May 2025 for Complete Exam and retinal OCT     Dr. Drummond [phone]       "

## 2024-05-06 NOTE — LETTER
5/6/2024         RE: Dejan Hammer  [address]  [place] [address]        Dear Colleague,    Thank you for referring your patient, Dejan Hammer, to the [facility]. Please see a copy of my visit note below.     Current Eye Medications: None     Subjective: Here for complete eye exam. Patient complains of fine print being blurry sometimes. Pushing glasses up slightly helps. Distance vision is stable. No eye pain or discomfort.      Objective:  See Ophthalmology Exam.       Assessment:      Plan:   See Patient Instructions.       Again, thank you for allowing me to participate in the care of your patient.        Sincerely,        Anson Drummond MD

## 2024-05-06 NOTE — PATIENT INSTRUCTIONS
"Glasses prescription given - optional  May use artificial tears up to four times a day (like Refresh Optive, Systane Balance, TheraTears, or generic artificial tears are ok. Avoid \"get the red out\" drops).   Possible posterior vitreous detachment (sudden onset large floater and/or flashing lights) both eyes discussed.   Call in January 2025 for an appointment in May 2025 for Complete Exam and retinal OCT     Dr. Drummond [phone]    "

## 2024-05-07 ENCOUNTER — TELEPHONE (OUTPATIENT)
Dept: OPHTHALMOLOGY | Facility: CLINIC | Age: 79
End: 2024-05-07
Payer: COMMERCIAL

## 2024-05-07 NOTE — TELEPHONE ENCOUNTER
[facility] Call Center    Phone Message    May a detailed message be left on voicemail: yes     Reason for Call: Form or Letter   Type or form/letter needing completion: After Summary visit from 05/06 appt  Provider: Hoda Fregoso form needed: asap  Once completed: Mail form to Name: Dejan Hammer, at Address: [address] [facility] [address], [place], [address]    Action Taken: Message routed to:  [facility]    Travel Screening: Not Applicable

## 2024-05-23 ENCOUNTER — TRANSFERRED RECORDS (OUTPATIENT)
Dept: HEALTH INFORMATION MANAGEMENT | Facility: CLINIC | Age: 79
End: 2024-05-23
Payer: COMMERCIAL

## 2024-06-04 ENCOUNTER — TRANSFERRED RECORDS (OUTPATIENT)
Dept: HEALTH INFORMATION MANAGEMENT | Facility: CLINIC | Age: 79
End: 2024-06-04
Payer: COMMERCIAL

## 2024-06-20 SDOH — HEALTH STABILITY: PHYSICAL HEALTH: ON AVERAGE, HOW MANY DAYS PER WEEK DO YOU ENGAGE IN MODERATE TO STRENUOUS EXERCISE (LIKE A BRISK WALK)?: 3 DAYS

## 2024-06-20 SDOH — HEALTH STABILITY: PHYSICAL HEALTH: ON AVERAGE, HOW MANY MINUTES DO YOU ENGAGE IN EXERCISE AT THIS LEVEL?: 60 MIN

## 2024-06-20 ASSESSMENT — SOCIAL DETERMINANTS OF HEALTH (SDOH): HOW OFTEN DO YOU GET TOGETHER WITH FRIENDS OR RELATIVES?: THREE TIMES A WEEK

## 2024-06-25 ENCOUNTER — OFFICE VISIT (OUTPATIENT)
Dept: FAMILY MEDICINE | Facility: CLINIC | Age: 79
End: 2024-06-25
Payer: COMMERCIAL

## 2024-06-25 VITALS
DIASTOLIC BLOOD PRESSURE: 77 MMHG | TEMPERATURE: 98 F | WEIGHT: 205 LBS | HEART RATE: 69 BPM | OXYGEN SATURATION: 100 % | HEIGHT: 70 IN | RESPIRATION RATE: 16 BRPM | BODY MASS INDEX: 29.35 KG/M2 | SYSTOLIC BLOOD PRESSURE: 134 MMHG

## 2024-06-25 DIAGNOSIS — Z23 NEED FOR SHINGLES VACCINE: ICD-10-CM

## 2024-06-25 DIAGNOSIS — Z12.5 SPECIAL SCREENING FOR MALIGNANT NEOPLASM OF PROSTATE: Primary | ICD-10-CM

## 2024-06-25 DIAGNOSIS — Z29.11 NEED FOR VACCINATION AGAINST RESPIRATORY SYNCYTIAL VIRUS: ICD-10-CM

## 2024-06-25 DIAGNOSIS — Z13.6 CARDIOVASCULAR SCREENING; LDL GOAL LESS THAN 160: ICD-10-CM

## 2024-06-25 DIAGNOSIS — R26.81 UNSTEADY GAIT: ICD-10-CM

## 2024-06-25 DIAGNOSIS — R27.0 ATAXIA, UNSPECIFIED: ICD-10-CM

## 2024-06-25 LAB
ALBUMIN SERPL BCG-MCNC: 4.1 G/DL (ref 3.5–5.2)
ALP SERPL-CCNC: 71 U/L (ref 40–150)
ALT SERPL W P-5'-P-CCNC: 16 U/L (ref 0–70)
ANION GAP SERPL CALCULATED.3IONS-SCNC: 12 MMOL/L (ref 7–15)
AST SERPL W P-5'-P-CCNC: 23 U/L (ref 0–45)
BILIRUB SERPL-MCNC: 0.8 MG/DL
BUN SERPL-MCNC: 29.2 MG/DL (ref 8–23)
CALCIUM SERPL-MCNC: 8.9 MG/DL (ref 8.8–10.2)
CHLORIDE SERPL-SCNC: 106 MMOL/L (ref 98–107)
CHOLEST SERPL-MCNC: 163 MG/DL
CREAT SERPL-MCNC: 1.24 MG/DL (ref 0.67–1.17)
DEPRECATED HCO3 PLAS-SCNC: 22 MMOL/L (ref 22–29)
EGFRCR SERPLBLD CKD-EPI 2021: 60 ML/MIN/1.73M2
FASTING STATUS PATIENT QL REPORTED: YES
FASTING STATUS PATIENT QL REPORTED: YES
GLUCOSE SERPL-MCNC: 94 MG/DL (ref 70–99)
HDLC SERPL-MCNC: 56 MG/DL
HGB BLD-MCNC: 13.3 G/DL (ref 13.3–17.7)
LDLC SERPL CALC-MCNC: 97 MG/DL
NONHDLC SERPL-MCNC: 107 MG/DL
POTASSIUM SERPL-SCNC: 4.4 MMOL/L (ref 3.4–5.3)
PROT SERPL-MCNC: 7.4 G/DL (ref 6.4–8.3)
PSA SERPL DL<=0.01 NG/ML-MCNC: 4.03 NG/ML (ref 0–6.5)
SODIUM SERPL-SCNC: 140 MMOL/L (ref 135–145)
TRIGL SERPL-MCNC: 49 MG/DL
TSH SERPL DL<=0.005 MIU/L-ACNC: 1.82 UIU/ML (ref 0.3–4.2)
VIT B12 SERPL-MCNC: 1143 PG/ML (ref 232–1245)

## 2024-06-25 PROCEDURE — 85018 HEMOGLOBIN: CPT | Performed by: INTERNAL MEDICINE

## 2024-06-25 PROCEDURE — 80061 LIPID PANEL: CPT | Performed by: INTERNAL MEDICINE

## 2024-06-25 PROCEDURE — 82607 VITAMIN B-12: CPT | Performed by: INTERNAL MEDICINE

## 2024-06-25 PROCEDURE — G0439 PPPS, SUBSEQ VISIT: HCPCS | Performed by: INTERNAL MEDICINE

## 2024-06-25 PROCEDURE — G0103 PSA SCREENING: HCPCS | Performed by: INTERNAL MEDICINE

## 2024-06-25 PROCEDURE — 80053 COMPREHEN METABOLIC PANEL: CPT | Performed by: INTERNAL MEDICINE

## 2024-06-25 PROCEDURE — 36415 COLL VENOUS BLD VENIPUNCTURE: CPT | Performed by: INTERNAL MEDICINE

## 2024-06-25 PROCEDURE — 84443 ASSAY THYROID STIM HORMONE: CPT | Performed by: INTERNAL MEDICINE

## 2024-06-25 RX ORDER — RESPIRATORY SYNCYTIAL VIRUS VACCINE 120MCG/0.5
0.5 KIT INTRAMUSCULAR ONCE
Qty: 1 EACH | Refills: 0 | Status: CANCELLED | OUTPATIENT
Start: 2024-06-25 | End: 2024-06-25
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
## 2024-06-26 ENCOUNTER — TRANSFERRED RECORDS (OUTPATIENT)
Dept: HEALTH INFORMATION MANAGEMENT | Facility: CLINIC | Age: 79
End: 2024-06-26
Payer: COMMERCIAL

## 2024-07-23 DIAGNOSIS — N40.1 BENIGN PROSTATIC HYPERPLASIA WITH INCOMPLETE BLADDER EMPTYING: Primary | ICD-10-CM

## 2024-07-23 DIAGNOSIS — R39.14 BENIGN PROSTATIC HYPERPLASIA WITH INCOMPLETE BLADDER EMPTYING: Primary | ICD-10-CM

## 2024-07-23 RX ORDER — TAMSULOSIN HYDROCHLORIDE 0.4 MG/1
0.4 CAPSULE ORAL DAILY
Qty: 90 CAPSULE | Refills: 2 | Status: SHIPPED | OUTPATIENT
Start: 2024-07-23

## 2024-10-29 ENCOUNTER — TRANSFERRED RECORDS (OUTPATIENT)
Dept: HEALTH INFORMATION MANAGEMENT | Facility: CLINIC | Age: 79
End: 2024-10-29
Payer: COMMERCIAL

## 2025-02-27 ENCOUNTER — TRANSFERRED RECORDS (OUTPATIENT)
Dept: HEALTH INFORMATION MANAGEMENT | Facility: CLINIC | Age: 80
End: 2025-02-27

## 2025-04-29 ENCOUNTER — TRANSFERRED RECORDS (OUTPATIENT)
Dept: HEALTH INFORMATION MANAGEMENT | Facility: CLINIC | Age: 80
End: 2025-04-29
Payer: COMMERCIAL

## 2025-05-23 ENCOUNTER — TRANSFERRED RECORDS (OUTPATIENT)
Dept: HEALTH INFORMATION MANAGEMENT | Facility: CLINIC | Age: 80
End: 2025-05-23
Payer: COMMERCIAL

## 2025-06-05 ENCOUNTER — TRANSFERRED RECORDS (OUTPATIENT)
Dept: HEALTH INFORMATION MANAGEMENT | Facility: CLINIC | Age: 80
End: 2025-06-05
Payer: COMMERCIAL

## 2025-06-16 DIAGNOSIS — K02.9 DENTAL CARIES: Primary | ICD-10-CM

## 2025-06-16 RX ORDER — OXYCODONE HYDROCHLORIDE 5 MG/1
5 TABLET ORAL EVERY 6 HOURS PRN
Qty: 12 TABLET | Refills: 0 | Status: SHIPPED | OUTPATIENT
Start: 2025-06-16 | End: 2025-06-19

## 2025-06-16 RX ORDER — IBUPROFEN 600 MG/1
600 TABLET, FILM COATED ORAL EVERY 6 HOURS PRN
Qty: 30 TABLET | Refills: 0 | Status: SHIPPED | OUTPATIENT
Start: 2025-06-16

## 2025-06-16 RX ORDER — CHLORHEXIDINE GLUCONATE ORAL RINSE 1.2 MG/ML
15 SOLUTION DENTAL 2 TIMES DAILY
Qty: 473 ML | Refills: 0 | Status: SHIPPED | OUTPATIENT
Start: 2025-06-16

## 2025-06-16 RX ORDER — AMOXICILLIN 500 MG/1
500 CAPSULE ORAL 3 TIMES DAILY
Qty: 21 CAPSULE | Refills: 0 | Status: SHIPPED | OUTPATIENT
Start: 2025-06-16 | End: 2025-06-23

## 2025-06-16 RX ORDER — ACETAMINOPHEN 325 MG/1
325-650 TABLET ORAL EVERY 6 HOURS PRN
Qty: 30 TABLET | Refills: 0 | Status: SHIPPED | OUTPATIENT
Start: 2025-06-16

## 2025-06-19 ENCOUNTER — TRANSFERRED RECORDS (OUTPATIENT)
Dept: HEALTH INFORMATION MANAGEMENT | Facility: CLINIC | Age: 80
End: 2025-06-19
Payer: COMMERCIAL

## 2025-06-22 SDOH — HEALTH STABILITY: PHYSICAL HEALTH: ON AVERAGE, HOW MANY DAYS PER WEEK DO YOU ENGAGE IN MODERATE TO STRENUOUS EXERCISE (LIKE A BRISK WALK)?: 2 DAYS

## 2025-06-22 SDOH — HEALTH STABILITY: PHYSICAL HEALTH: ON AVERAGE, HOW MANY MINUTES DO YOU ENGAGE IN EXERCISE AT THIS LEVEL?: 40 MIN

## 2025-06-22 ASSESSMENT — SOCIAL DETERMINANTS OF HEALTH (SDOH): HOW OFTEN DO YOU GET TOGETHER WITH FRIENDS OR RELATIVES?: ONCE A WEEK

## 2025-06-26 ENCOUNTER — OFFICE VISIT (OUTPATIENT)
Dept: FAMILY MEDICINE | Facility: CLINIC | Age: 80
End: 2025-06-26
Payer: COMMERCIAL

## 2025-06-26 VITALS
HEIGHT: 70 IN | SYSTOLIC BLOOD PRESSURE: 136 MMHG | HEART RATE: 62 BPM | WEIGHT: 207 LBS | OXYGEN SATURATION: 97 % | RESPIRATION RATE: 17 BRPM | TEMPERATURE: 97.6 F | BODY MASS INDEX: 29.63 KG/M2 | DIASTOLIC BLOOD PRESSURE: 81 MMHG

## 2025-06-26 DIAGNOSIS — R06.83 SNORING: ICD-10-CM

## 2025-06-26 DIAGNOSIS — N52.9 ERECTILE DYSFUNCTION, UNSPECIFIED ERECTILE DYSFUNCTION TYPE: ICD-10-CM

## 2025-06-26 DIAGNOSIS — Z12.5 SPECIAL SCREENING FOR MALIGNANT NEOPLASM OF PROSTATE: ICD-10-CM

## 2025-06-26 DIAGNOSIS — N40.1 BENIGN PROSTATIC HYPERPLASIA WITH LOWER URINARY TRACT SYMPTOMS, SYMPTOM DETAILS UNSPECIFIED: ICD-10-CM

## 2025-06-26 DIAGNOSIS — Z23 NEED FOR VACCINATION: ICD-10-CM

## 2025-06-26 DIAGNOSIS — R26.81 UNSTEADY GAIT: ICD-10-CM

## 2025-06-26 DIAGNOSIS — Z00.00 ENCOUNTER FOR MEDICARE ANNUAL WELLNESS EXAM: Primary | ICD-10-CM

## 2025-06-26 DIAGNOSIS — R39.14 BENIGN PROSTATIC HYPERPLASIA WITH INCOMPLETE BLADDER EMPTYING: ICD-10-CM

## 2025-06-26 DIAGNOSIS — E53.8 VITAMIN B12 DEFICIENCY (NON ANEMIC): ICD-10-CM

## 2025-06-26 DIAGNOSIS — R27.0 ATAXIA, UNSPECIFIED: ICD-10-CM

## 2025-06-26 DIAGNOSIS — E78.5 HYPERLIPIDEMIA LDL GOAL <100: ICD-10-CM

## 2025-06-26 DIAGNOSIS — N40.1 BENIGN PROSTATIC HYPERPLASIA WITH INCOMPLETE BLADDER EMPTYING: ICD-10-CM

## 2025-06-26 LAB
ALBUMIN SERPL BCG-MCNC: 4 G/DL (ref 3.5–5.2)
ALP SERPL-CCNC: 60 U/L (ref 40–150)
ALT SERPL W P-5'-P-CCNC: 18 U/L (ref 0–70)
ANION GAP SERPL CALCULATED.3IONS-SCNC: 10 MMOL/L (ref 7–15)
AST SERPL W P-5'-P-CCNC: 25 U/L (ref 0–45)
BILIRUB SERPL-MCNC: 0.6 MG/DL
BUN SERPL-MCNC: 21.3 MG/DL (ref 8–23)
CALCIUM SERPL-MCNC: 9.1 MG/DL (ref 8.8–10.4)
CHLORIDE SERPL-SCNC: 105 MMOL/L (ref 98–107)
CHOLEST SERPL-MCNC: 153 MG/DL
CREAT SERPL-MCNC: 1.3 MG/DL (ref 0.67–1.17)
EGFRCR SERPLBLD CKD-EPI 2021: 56 ML/MIN/1.73M2
FASTING STATUS PATIENT QL REPORTED: YES
FASTING STATUS PATIENT QL REPORTED: YES
GLUCOSE SERPL-MCNC: 103 MG/DL (ref 70–99)
HCO3 SERPL-SCNC: 26 MMOL/L (ref 22–29)
HDLC SERPL-MCNC: 50 MG/DL
LDLC SERPL CALC-MCNC: 94 MG/DL
NONHDLC SERPL-MCNC: 103 MG/DL
POTASSIUM SERPL-SCNC: 5 MMOL/L (ref 3.4–5.3)
PROT SERPL-MCNC: 7.1 G/DL (ref 6.4–8.3)
PSA SERPL DL<=0.01 NG/ML-MCNC: 3.64 NG/ML (ref 0–6.5)
SODIUM SERPL-SCNC: 141 MMOL/L (ref 135–145)
TRIGL SERPL-MCNC: 43 MG/DL
TSH SERPL DL<=0.005 MIU/L-ACNC: 1.82 UIU/ML (ref 0.3–4.2)
VIT B12 SERPL-MCNC: 2227 PG/ML (ref 232–1245)

## 2025-06-26 RX ORDER — SILDENAFIL 100 MG/1
100 TABLET, FILM COATED ORAL DAILY PRN
Qty: 15 TABLET | Refills: 3 | Status: SHIPPED | OUTPATIENT
Start: 2025-06-26

## 2025-06-26 ASSESSMENT — PAIN SCALES - GENERAL: PAINLEVEL_OUTOF10: NO PAIN (0)
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
## 2025-06-26 NOTE — PATIENT INSTRUCTIONS
Patient Education   Preventive Care Advice   This is general advice given by our system to help you stay healthy. However, your care team may have specific advice just for you. Please talk to your care team about your preventive care needs.  Nutrition  Eat 5 or more servings of fruits and vegetables each day.  Try wheat bread, brown rice and whole grain pasta (instead of white bread, rice, and pasta).  Get enough calcium and vitamin D. Check the label on foods and aim for 100% of the RDA (recommended daily allowance).  Lifestyle  Exercise at least 150 minutes each week  (30 minutes a day, 5 days a week).  Do muscle strengthening activities 2 days a week. These help control your weight and prevent disease.  No smoking.  Wear sunscreen to prevent skin cancer.  Have a dental exam and cleaning every 6 months.  Yearly exams  See your health care team every year to talk about:  Any changes in your health.  Any medicines your care team has prescribed.  Preventive care, family planning, and ways to prevent chronic diseases.  Shots (vaccines)   HPV shots (up to age 26), if you've never had them before.  Hepatitis B shots (up to age 59), if you've never had them before.  COVID-19 shot: Get this shot when it's due.  Flu shot: Get a flu shot every year.  Tetanus shot: Get a tetanus shot every 10 years.  Pneumococcal, hepatitis A, and RSV shots: Ask your care team if you need these based on your risk.  Shingles shot (for age 50 and up)  General health tests  Diabetes screening:  Starting at age 35, Get screened for diabetes at least every 3 years.  If you are younger than age 35, ask your care team if you should be screened for diabetes.  Cholesterol test: At age 39, start having a cholesterol test every 5 years, or more often if advised.  Bone density scan (DEXA): At age 50, ask your care team if you should have this scan for osteoporosis (brittle bones).  Hepatitis C: Get tested at least once in your life.  STIs (sexually  transmitted infections)  Before age 24: Ask your care team if you should be screened for STIs.  After age 24: Get screened for STIs if you're at risk. You are at risk for STIs (including HIV) if:  You are sexually active with more than one person.  You don't use condoms every time.  You or a partner was diagnosed with a sexually transmitted infection.  If you are at risk for HIV, ask about PrEP medicine to prevent HIV.  Get tested for HIV at least once in your life, whether you are at risk for HIV or not.  Cancer screening tests  Cervical cancer screening: If you have a cervix, begin getting regular cervical cancer screening tests starting at age 21.  Breast cancer scan (mammogram): If you've ever had breasts, begin having regular mammograms starting at age 40. This is a scan to check for breast cancer.  Colon cancer screening: It is important to start screening for colon cancer at age 45.  Have a colonoscopy test every 10 years (or more often if you're at risk) Or, ask your provider about stool tests like a FIT test every year or Cologuard test every 3 years.  To learn more about your testing options, visit:   .  For help making a decision, visit:   https://bit.ly/ct60097.  Prostate cancer screening test: If you have a prostate, ask your care team if a prostate cancer screening test (PSA) at age 55 is right for you.  Lung cancer screening: If you are a current or former smoker ages 50 to 80, ask your care team if ongoing lung cancer screenings are right for you.  For informational purposes only. Not to replace the advice of your health care provider. Copyright   2023 [employer]. All rights reserved. Clinically reviewed by the [facility]. [employer] [id number] - REV 01/24.

## 2025-06-27 ENCOUNTER — RESULTS FOLLOW-UP (OUTPATIENT)
Dept: FAMILY MEDICINE | Facility: CLINIC | Age: 80
End: 2025-06-27

## 2025-06-30 ENCOUNTER — PATIENT OUTREACH (OUTPATIENT)
Dept: CARE COORDINATION | Facility: CLINIC | Age: 80
End: 2025-06-30
Payer: COMMERCIAL

## 2025-07-02 ENCOUNTER — TELEPHONE (OUTPATIENT)
Dept: FAMILY MEDICINE | Facility: CLINIC | Age: 80
End: 2025-07-02
Payer: COMMERCIAL

## 2025-07-02 DIAGNOSIS — R06.83 SNORING: Primary | ICD-10-CM

## 2025-07-02 NOTE — TELEPHONE ENCOUNTER
Order/Referral Request    Who is requesting: Patient    Orders being requested: Sleep referral    Reason service is needed/diagnosis: snoring    When are orders needed by:     Has this been discussed with Provider: Yes    Does patient have a preference on a Group/Provider/Facility?     Does patient have an appointment scheduled?: No    Where to send orders: Place orders within Epic    Could we send this information to you in [facility] or would you prefer to receive a phone call?:   Patient would like to be contacted via [facility]

## 2025-07-03 ENCOUNTER — PATIENT OUTREACH (OUTPATIENT)
Dept: CARE COORDINATION | Facility: CLINIC | Age: 80
End: 2025-07-03
Payer: COMMERCIAL

## 2025-07-07 ENCOUNTER — PATIENT OUTREACH (OUTPATIENT)
Dept: CARE COORDINATION | Facility: CLINIC | Age: 80
End: 2025-07-07
Payer: COMMERCIAL

## 2025-09-01 ASSESSMENT — SLEEP AND FATIGUE QUESTIONNAIRES
HOW LIKELY ARE YOU TO NOD OFF OR FALL ASLEEP WHILE SITTING INACTIVE IN A PUBLIC PLACE: SLIGHT CHANCE OF DOZING
HOW LIKELY ARE YOU TO NOD OFF OR FALL ASLEEP WHILE WATCHING TV: SLIGHT CHANCE OF DOZING
HOW LIKELY ARE YOU TO NOD OFF OR FALL ASLEEP WHEN YOU ARE A PASSENGER IN A CAR FOR AN HOUR WITHOUT A BREAK: WOULD NEVER DOZE
HOW LIKELY ARE YOU TO NOD OFF OR FALL ASLEEP WHILE SITTING AND TALKING TO SOMEONE: WOULD NEVER DOZE
HOW LIKELY ARE YOU TO NOD OFF OR FALL ASLEEP WHILE SITTING AND READING: SLIGHT CHANCE OF DOZING
HOW LIKELY ARE YOU TO NOD OFF OR FALL ASLEEP WHILE LYING DOWN TO REST IN THE AFTERNOON WHEN CIRCUMSTANCES PERMIT: SLIGHT CHANCE OF DOZING
HOW LIKELY ARE YOU TO NOD OFF OR FALL ASLEEP WHILE SITTING QUIETLY AFTER LUNCH WITHOUT ALCOHOL: WOULD NEVER DOZE
HOW LIKELY ARE YOU TO NOD OFF OR FALL ASLEEP IN A CAR, WHILE STOPPED FOR A FEW MINUTES IN TRAFFIC: WOULD NEVER DOZE

## 2025-09-02 ENCOUNTER — OFFICE VISIT (OUTPATIENT)
Dept: SLEEP MEDICINE | Facility: CLINIC | Age: 80
End: 2025-09-02
Attending: INTERNAL MEDICINE
Payer: COMMERCIAL

## 2025-09-02 VITALS
SYSTOLIC BLOOD PRESSURE: 144 MMHG | OXYGEN SATURATION: 97 % | HEIGHT: 70 IN | HEART RATE: 54 BPM | DIASTOLIC BLOOD PRESSURE: 86 MMHG | WEIGHT: 207.6 LBS | BODY MASS INDEX: 29.72 KG/M2

## 2025-09-02 DIAGNOSIS — G47.00 FREQUENT NOCTURNAL AWAKENING: ICD-10-CM

## 2025-09-02 DIAGNOSIS — R06.81 WITNESSED EPISODE OF APNEA: ICD-10-CM

## 2025-09-02 DIAGNOSIS — R68.2 DRY MOUTH: ICD-10-CM

## 2025-09-02 DIAGNOSIS — R40.0 INTERMITTENT DROWSINESS: ICD-10-CM

## 2025-09-02 DIAGNOSIS — R06.83 SNORING: Primary | ICD-10-CM

## 2025-09-02 PROCEDURE — 1126F AMNT PAIN NOTED NONE PRSNT: CPT | Performed by: INTERNAL MEDICINE

## 2025-09-02 PROCEDURE — 3079F DIAST BP 80-89 MM HG: CPT | Performed by: INTERNAL MEDICINE

## 2025-09-02 PROCEDURE — 99204 OFFICE O/P NEW MOD 45 MIN: CPT | Performed by: INTERNAL MEDICINE

## 2025-09-02 PROCEDURE — 3077F SYST BP >= 140 MM HG: CPT | Performed by: INTERNAL MEDICINE
